# Patient Record
Sex: FEMALE | Race: BLACK OR AFRICAN AMERICAN | Employment: UNEMPLOYED | URBAN - METROPOLITAN AREA
[De-identification: names, ages, dates, MRNs, and addresses within clinical notes are randomized per-mention and may not be internally consistent; named-entity substitution may affect disease eponyms.]

---

## 2020-12-12 ENCOUNTER — APPOINTMENT (OUTPATIENT)
Dept: NON INVASIVE DIAGNOSTICS | Age: 65
DRG: 065 | End: 2020-12-12
Attending: INTERNAL MEDICINE
Payer: MEDICARE

## 2020-12-12 ENCOUNTER — APPOINTMENT (OUTPATIENT)
Dept: CT IMAGING | Age: 65
DRG: 065 | End: 2020-12-12
Attending: EMERGENCY MEDICINE
Payer: MEDICARE

## 2020-12-12 ENCOUNTER — HOSPITAL ENCOUNTER (INPATIENT)
Age: 65
LOS: 2 days | Discharge: HOME HEALTH | DRG: 065 | End: 2020-12-15
Attending: EMERGENCY MEDICINE | Admitting: HOSPITALIST
Payer: MEDICARE

## 2020-12-12 ENCOUNTER — APPOINTMENT (OUTPATIENT)
Dept: GENERAL RADIOLOGY | Age: 65
DRG: 065 | End: 2020-12-12
Attending: INTERNAL MEDICINE
Payer: MEDICARE

## 2020-12-12 ENCOUNTER — APPOINTMENT (OUTPATIENT)
Dept: MRI IMAGING | Age: 65
DRG: 065 | End: 2020-12-12
Attending: EMERGENCY MEDICINE
Payer: MEDICARE

## 2020-12-12 DIAGNOSIS — I10 UNCONTROLLED HYPERTENSION: Primary | ICD-10-CM

## 2020-12-12 DIAGNOSIS — I63.81 CEREBROVASCULAR ACCIDENT (CVA) DUE TO OCCLUSION OF SMALL ARTERY (HCC): ICD-10-CM

## 2020-12-12 PROBLEM — I16.9 HYPERTENSIVE CRISIS: Status: ACTIVE | Noted: 2020-12-12

## 2020-12-12 PROBLEM — G45.9 TIA (TRANSIENT ISCHEMIC ATTACK): Status: ACTIVE | Noted: 2020-12-12

## 2020-12-12 PROBLEM — R20.2 NUMBNESS AND TINGLING IN RIGHT HAND: Status: ACTIVE | Noted: 2020-12-12

## 2020-12-12 PROBLEM — E78.5 HLD (HYPERLIPIDEMIA): Status: ACTIVE | Noted: 2020-12-12

## 2020-12-12 PROBLEM — R26.89 IMBALANCE: Status: ACTIVE | Noted: 2020-12-12

## 2020-12-12 PROBLEM — R20.0 NUMBNESS AND TINGLING IN RIGHT HAND: Status: ACTIVE | Noted: 2020-12-12

## 2020-12-12 LAB
ALBUMIN SERPL-MCNC: 3.7 G/DL (ref 3.4–5)
ALBUMIN/GLOB SERPL: 0.9 {RATIO} (ref 0.8–1.7)
ALP SERPL-CCNC: 95 U/L (ref 45–117)
ALT SERPL-CCNC: 20 U/L (ref 13–56)
ANION GAP SERPL CALC-SCNC: 5 MMOL/L (ref 3–18)
APPEARANCE UR: CLEAR
AST SERPL-CCNC: 13 U/L (ref 10–38)
ATRIAL RATE: 59 BPM
BASOPHILS # BLD: 0 K/UL (ref 0–0.1)
BASOPHILS NFR BLD: 0 % (ref 0–2)
BILIRUB SERPL-MCNC: 0.6 MG/DL (ref 0.2–1)
BILIRUB UR QL: NEGATIVE
BUN SERPL-MCNC: 19 MG/DL (ref 7–18)
BUN/CREAT SERPL: 16 (ref 12–20)
CALCIUM SERPL-MCNC: 9.1 MG/DL (ref 8.5–10.1)
CALCULATED P AXIS, ECG09: 36 DEGREES
CALCULATED R AXIS, ECG10: -2 DEGREES
CALCULATED T AXIS, ECG11: 56 DEGREES
CHLORIDE SERPL-SCNC: 102 MMOL/L (ref 100–111)
CHOLEST SERPL-MCNC: 232 MG/DL
CK MB CFR SERPL CALC: 1.8 % (ref 0–4)
CK MB SERPL-MCNC: 1.7 NG/ML (ref 5–25)
CK SERPL-CCNC: 97 U/L (ref 26–192)
CO2 SERPL-SCNC: 34 MMOL/L (ref 21–32)
COLOR UR: YELLOW
CREAT SERPL-MCNC: 1.18 MG/DL (ref 0.6–1.3)
DIAGNOSIS, 93000: NORMAL
DIFFERENTIAL METHOD BLD: ABNORMAL
ECHO AO ROOT DIAM: 3.15 CM
ECHO LA AREA 4C: 19.63 CM2
ECHO LA MAJOR AXIS: 3.65 CM
ECHO LA MINOR AXIS: 2.01 CM
ECHO LA VOL 2C: 60.25 ML (ref 22–52)
ECHO LA VOL 4C: 60.63 ML (ref 22–52)
ECHO LA VOL BP: 66.5 ML (ref 22–52)
ECHO LA VOL/BSA BIPLANE: 36.61 ML/M2 (ref 16–28)
ECHO LA VOLUME INDEX A2C: 33.17 ML/M2 (ref 16–28)
ECHO LA VOLUME INDEX A4C: 33.38 ML/M2 (ref 16–28)
ECHO LV E' LATERAL VELOCITY: 5.19 CM/S
ECHO LV E' SEPTAL VELOCITY: 3.59 CM/S
ECHO LV EDV A2C: 82.1 ML
ECHO LV EDV A4C: 86.83 ML
ECHO LV EDV BP: 85.06 ML (ref 56–104)
ECHO LV EDV INDEX A4C: 47.8 ML/M2
ECHO LV EDV INDEX BP: 46.8 ML/M2
ECHO LV EDV NDEX A2C: 45.2 ML/M2
ECHO LV EJECTION FRACTION A2C: 54 PERCENT
ECHO LV EJECTION FRACTION A4C: 63 PERCENT
ECHO LV EJECTION FRACTION BIPLANE: 57.8 PERCENT (ref 55–100)
ECHO LV ESV A2C: 38.17 ML
ECHO LV ESV A4C: 32.21 ML
ECHO LV ESV BP: 35.87 ML (ref 19–49)
ECHO LV ESV INDEX A2C: 21 ML/M2
ECHO LV ESV INDEX A4C: 17.7 ML/M2
ECHO LV ESV INDEX BP: 19.7 ML/M2
ECHO LV INTERNAL DIMENSION DIASTOLIC: 3.63 CM (ref 3.9–5.3)
ECHO LV INTERNAL DIMENSION SYSTOLIC: 3.14 CM
ECHO LV IVSD: 1.61 CM (ref 0.6–0.9)
ECHO LV MASS 2D: 169.8 G (ref 67–162)
ECHO LV MASS INDEX 2D: 93.5 G/M2 (ref 43–95)
ECHO LV POSTERIOR WALL DIASTOLIC: 1.07 CM (ref 0.6–0.9)
ECHO LVOT DIAM: 1.84 CM
ECHO LVOT PEAK GRADIENT: 5.26 MMHG
ECHO LVOT VTI: 24.64 CM
ECHO MV A VELOCITY: 114.86 CM/S
ECHO MV E DECELERATION TIME (DT): 427.46 MS
ECHO MV E VELOCITY: 62.48 CM/S
ECHO MV E/A RATIO: 0.54
ECHO MV E/E' LATERAL: 12.04
ECHO MV E/E' RATIO (AVERAGED): 14.72
ECHO MV E/E' SEPTAL: 17.4
ECHO TV REGURGITANT MAX VELOCITY: 194 CM/S
ECHO TV REGURGITANT PEAK GRADIENT: 15.1 MMHG
EOSINOPHIL # BLD: 0.1 K/UL (ref 0–0.4)
EOSINOPHIL NFR BLD: 1 % (ref 0–5)
ERYTHROCYTE [DISTWIDTH] IN BLOOD BY AUTOMATED COUNT: 13.6 % (ref 11.6–14.5)
EST. AVERAGE GLUCOSE BLD GHB EST-MCNC: 108 MG/DL
GLOBULIN SER CALC-MCNC: 4 G/DL (ref 2–4)
GLUCOSE SERPL-MCNC: 122 MG/DL (ref 74–99)
GLUCOSE UR STRIP.AUTO-MCNC: NEGATIVE MG/DL
HBA1C MFR BLD: 5.4 % (ref 4.2–5.6)
HCT VFR BLD AUTO: 40.8 % (ref 35–45)
HDLC SERPL-MCNC: 86 MG/DL (ref 40–60)
HDLC SERPL: 2.7 {RATIO} (ref 0–5)
HGB BLD-MCNC: 14.1 G/DL (ref 12–16)
HGB UR QL STRIP: NEGATIVE
KETONES UR QL STRIP.AUTO: NEGATIVE MG/DL
LDLC SERPL CALC-MCNC: 135 MG/DL (ref 0–100)
LEUKOCYTE ESTERASE UR QL STRIP.AUTO: NEGATIVE
LIPID PROFILE,FLP: ABNORMAL
LVOT MG: 3.3 MMHG
LYMPHOCYTES # BLD: 2.8 K/UL (ref 0.9–3.6)
LYMPHOCYTES NFR BLD: 26 % (ref 21–52)
MCH RBC QN AUTO: 29.4 PG (ref 24–34)
MCHC RBC AUTO-ENTMCNC: 34.6 G/DL (ref 31–37)
MCV RBC AUTO: 85.2 FL (ref 74–97)
MONOCYTES # BLD: 0.6 K/UL (ref 0.05–1.2)
MONOCYTES NFR BLD: 5 % (ref 3–10)
NEUTS SEG # BLD: 7.5 K/UL (ref 1.8–8)
NEUTS SEG NFR BLD: 68 % (ref 40–73)
NITRITE UR QL STRIP.AUTO: NEGATIVE
P-R INTERVAL, ECG05: 176 MS
PH UR STRIP: 8 [PH] (ref 5–8)
PLATELET # BLD AUTO: 157 K/UL (ref 135–420)
PMV BLD AUTO: 12.8 FL (ref 9.2–11.8)
POTASSIUM SERPL-SCNC: 3.5 MMOL/L (ref 3.5–5.5)
PROT SERPL-MCNC: 7.7 G/DL (ref 6.4–8.2)
PROT UR STRIP-MCNC: NEGATIVE MG/DL
Q-T INTERVAL, ECG07: 442 MS
QRS DURATION, ECG06: 76 MS
QTC CALCULATION (BEZET), ECG08: 437 MS
RBC # BLD AUTO: 4.79 M/UL (ref 4.2–5.3)
SODIUM SERPL-SCNC: 141 MMOL/L (ref 136–145)
SP GR UR REFRACTOMETRY: 1.02 (ref 1–1.03)
TRIGL SERPL-MCNC: 55 MG/DL (ref ?–150)
TROPONIN I SERPL-MCNC: <0.02 NG/ML (ref 0–0.04)
TROPONIN I SERPL-MCNC: <0.02 NG/ML (ref 0–0.04)
TSH SERPL DL<=0.05 MIU/L-ACNC: 0.92 UIU/ML (ref 0.36–3.74)
UROBILINOGEN UR QL STRIP.AUTO: 0.2 EU/DL (ref 0.2–1)
VENTRICULAR RATE, ECG03: 59 BPM
VLDLC SERPL CALC-MCNC: 11 MG/DL
WBC # BLD AUTO: 11 K/UL (ref 4.6–13.2)

## 2020-12-12 PROCEDURE — 97535 SELF CARE MNGMENT TRAINING: CPT

## 2020-12-12 PROCEDURE — 97161 PT EVAL LOW COMPLEX 20 MIN: CPT

## 2020-12-12 PROCEDURE — 85025 COMPLETE CBC W/AUTO DIFF WBC: CPT

## 2020-12-12 PROCEDURE — 99218 HC RM OBSERVATION: CPT

## 2020-12-12 PROCEDURE — 83036 HEMOGLOBIN GLYCOSYLATED A1C: CPT

## 2020-12-12 PROCEDURE — 74011636320 HC RX REV CODE- 636/320: Performed by: EMERGENCY MEDICINE

## 2020-12-12 PROCEDURE — 74011000250 HC RX REV CODE- 250: Performed by: HOSPITALIST

## 2020-12-12 PROCEDURE — A9575 INJ GADOTERATE MEGLUMI 0.1ML: HCPCS | Performed by: EMERGENCY MEDICINE

## 2020-12-12 PROCEDURE — 97116 GAIT TRAINING THERAPY: CPT

## 2020-12-12 PROCEDURE — 77030021352 HC CBL LD SYS DISP COVD -B

## 2020-12-12 PROCEDURE — 80053 COMPREHEN METABOLIC PANEL: CPT

## 2020-12-12 PROCEDURE — 99285 EMERGENCY DEPT VISIT HI MDM: CPT

## 2020-12-12 PROCEDURE — 71045 X-RAY EXAM CHEST 1 VIEW: CPT

## 2020-12-12 PROCEDURE — 70496 CT ANGIOGRAPHY HEAD: CPT

## 2020-12-12 PROCEDURE — 80061 LIPID PANEL: CPT

## 2020-12-12 PROCEDURE — 84443 ASSAY THYROID STIM HORMONE: CPT

## 2020-12-12 PROCEDURE — 2709999900 HC NON-CHARGEABLE SUPPLY

## 2020-12-12 PROCEDURE — 70553 MRI BRAIN STEM W/O & W/DYE: CPT

## 2020-12-12 PROCEDURE — 93005 ELECTROCARDIOGRAM TRACING: CPT

## 2020-12-12 PROCEDURE — 96374 THER/PROPH/DIAG INJ IV PUSH: CPT

## 2020-12-12 PROCEDURE — 97166 OT EVAL MOD COMPLEX 45 MIN: CPT

## 2020-12-12 PROCEDURE — 84484 ASSAY OF TROPONIN QUANT: CPT

## 2020-12-12 PROCEDURE — 74011250636 HC RX REV CODE- 250/636: Performed by: EMERGENCY MEDICINE

## 2020-12-12 PROCEDURE — 82550 ASSAY OF CK (CPK): CPT

## 2020-12-12 PROCEDURE — 70450 CT HEAD/BRAIN W/O DYE: CPT

## 2020-12-12 PROCEDURE — 81003 URINALYSIS AUTO W/O SCOPE: CPT

## 2020-12-12 PROCEDURE — 74011000636 HC RX REV CODE- 636: Performed by: EMERGENCY MEDICINE

## 2020-12-12 PROCEDURE — 92610 EVALUATE SWALLOWING FUNCTION: CPT

## 2020-12-12 PROCEDURE — 96375 TX/PRO/DX INJ NEW DRUG ADDON: CPT

## 2020-12-12 PROCEDURE — 74011250637 HC RX REV CODE- 250/637: Performed by: INTERNAL MEDICINE

## 2020-12-12 PROCEDURE — 93306 TTE W/DOPPLER COMPLETE: CPT

## 2020-12-12 PROCEDURE — 74011250637 HC RX REV CODE- 250/637: Performed by: EMERGENCY MEDICINE

## 2020-12-12 RX ORDER — ATORVASTATIN CALCIUM 20 MG/1
80 TABLET, FILM COATED ORAL
Status: DISCONTINUED | OUTPATIENT
Start: 2020-12-12 | End: 2020-12-16 | Stop reason: HOSPADM

## 2020-12-12 RX ORDER — LABETALOL HYDROCHLORIDE 5 MG/ML
5 INJECTION, SOLUTION INTRAVENOUS
Status: DISCONTINUED | OUTPATIENT
Start: 2020-12-12 | End: 2020-12-12

## 2020-12-12 RX ORDER — HYDROXYZINE 25 MG/1
25 TABLET, FILM COATED ORAL
Status: DISCONTINUED | OUTPATIENT
Start: 2020-12-12 | End: 2020-12-16 | Stop reason: HOSPADM

## 2020-12-12 RX ORDER — ACETAMINOPHEN 325 MG/1
650 TABLET ORAL
Status: DISCONTINUED | OUTPATIENT
Start: 2020-12-12 | End: 2020-12-16 | Stop reason: HOSPADM

## 2020-12-12 RX ORDER — HYDROXYZINE 25 MG/1
1-2 TABLET, FILM COATED ORAL
COMMUNITY
Start: 2020-10-22 | End: 2020-12-15

## 2020-12-12 RX ORDER — LABETALOL HYDROCHLORIDE 5 MG/ML
5 INJECTION, SOLUTION INTRAVENOUS
Status: DISCONTINUED | OUTPATIENT
Start: 2020-12-12 | End: 2020-12-16 | Stop reason: HOSPADM

## 2020-12-12 RX ORDER — CHLORTHALIDONE 25 MG/1
25 TABLET ORAL DAILY
Status: DISCONTINUED | OUTPATIENT
Start: 2020-12-13 | End: 2020-12-16 | Stop reason: HOSPADM

## 2020-12-12 RX ORDER — GUAIFENESIN 100 MG/5ML
81 LIQUID (ML) ORAL DAILY
Status: DISCONTINUED | OUTPATIENT
Start: 2020-12-12 | End: 2020-12-16 | Stop reason: HOSPADM

## 2020-12-12 RX ORDER — GUAIFENESIN 100 MG/5ML
162 LIQUID (ML) ORAL
Status: COMPLETED | OUTPATIENT
Start: 2020-12-12 | End: 2020-12-12

## 2020-12-12 RX ORDER — LOSARTAN POTASSIUM 50 MG/1
50 TABLET ORAL DAILY
Status: DISCONTINUED | OUTPATIENT
Start: 2020-12-13 | End: 2020-12-16 | Stop reason: HOSPADM

## 2020-12-12 RX ORDER — DOCUSATE SODIUM 100 MG/1
100 CAPSULE, LIQUID FILLED ORAL
Status: DISCONTINUED | OUTPATIENT
Start: 2020-12-12 | End: 2020-12-16 | Stop reason: HOSPADM

## 2020-12-12 RX ORDER — IPRATROPIUM BROMIDE AND ALBUTEROL SULFATE 2.5; .5 MG/3ML; MG/3ML
3 SOLUTION RESPIRATORY (INHALATION)
Status: DISCONTINUED | OUTPATIENT
Start: 2020-12-12 | End: 2020-12-16 | Stop reason: HOSPADM

## 2020-12-12 RX ORDER — PANTOPRAZOLE SODIUM 40 MG/10ML
40 INJECTION, POWDER, LYOPHILIZED, FOR SOLUTION INTRAVENOUS DAILY PRN
Status: DISCONTINUED | OUTPATIENT
Start: 2020-12-12 | End: 2020-12-16 | Stop reason: HOSPADM

## 2020-12-12 RX ORDER — ONDANSETRON 2 MG/ML
4 INJECTION INTRAMUSCULAR; INTRAVENOUS
Status: COMPLETED | OUTPATIENT
Start: 2020-12-12 | End: 2020-12-12

## 2020-12-12 RX ORDER — ONDANSETRON 2 MG/ML
4 INJECTION INTRAMUSCULAR; INTRAVENOUS
Status: DISCONTINUED | OUTPATIENT
Start: 2020-12-12 | End: 2020-12-16 | Stop reason: HOSPADM

## 2020-12-12 RX ORDER — ATENOLOL 100 MG/1
1 TABLET ORAL DAILY
COMMUNITY
Start: 2020-11-30 | End: 2020-12-15

## 2020-12-12 RX ORDER — LANOLIN ALCOHOL/MO/W.PET/CERES
12 CREAM (GRAM) TOPICAL
Status: DISCONTINUED | OUTPATIENT
Start: 2020-12-12 | End: 2020-12-16 | Stop reason: HOSPADM

## 2020-12-12 RX ADMIN — SODIUM CHLORIDE 1000 ML: 900 INJECTION, SOLUTION INTRAVENOUS at 01:08

## 2020-12-12 RX ADMIN — ASPIRIN 81 MG CHEWABLE TABLET 162 MG: 81 TABLET CHEWABLE at 01:43

## 2020-12-12 RX ADMIN — IOPAMIDOL 100 ML: 612 INJECTION, SOLUTION INTRAVENOUS at 02:15

## 2020-12-12 RX ADMIN — LABETALOL HYDROCHLORIDE 5 MG: 5 INJECTION INTRAVENOUS at 18:58

## 2020-12-12 RX ADMIN — ACETAMINOPHEN 650 MG: 325 TABLET, FILM COATED ORAL at 20:30

## 2020-12-12 RX ADMIN — GADOTERATE MEGLUMINE 15 ML: 376.9 INJECTION INTRAVENOUS at 04:02

## 2020-12-12 RX ADMIN — ONDANSETRON 4 MG: 2 INJECTION INTRAMUSCULAR; INTRAVENOUS at 01:42

## 2020-12-12 RX ADMIN — ASPIRIN 81 MG CHEWABLE TABLET 81 MG: 81 TABLET CHEWABLE at 10:24

## 2020-12-12 RX ADMIN — ATORVASTATIN CALCIUM 80 MG: 20 TABLET, FILM COATED ORAL at 22:37

## 2020-12-12 NOTE — ED PROVIDER NOTES
75 yo CF with PMHx HTN presents with 1-day h/o right arm tingling and feeling off balance. Pt states that she was doing ok, had a krispy kreme donut to eat around 2200 and then started to feeling tingling sensation to right hand. Pt states she went to get some gas and then went home and seemed like she was leaning to the right when walking. Pt had some vomiting. Pt states she could tell her blood pressure was high. No fevers, no chest pain, no slurred speech or weakness. Past Medical History:   Diagnosis Date    History of dental abscess     HLD (hyperlipidemia)     Hypertension     Non-Complicance with Hypertension medications (Atenolol)    Lacunar infarction (HCC)     Bilateral Thalamic Infarctions    Lichen sclerosus     of genitalia    Nephrolithiasis     Osteopenia 09/13/2019    By DEXA Scan on 3/14/7458    Umbilical hernia     Small, Fat-containing Only    Vitamin D deficiency 10/08/2015       History reviewed. No pertinent surgical history.       Family History:   Problem Relation Age of Onset    Hypertension Mother     Hypertension Maternal Aunt        Social History     Socioeconomic History    Marital status: SINGLE     Spouse name: Not on file    Number of children: Not on file    Years of education: Not on file    Highest education level: Not on file   Occupational History    Not on file   Social Needs    Financial resource strain: Not on file    Food insecurity     Worry: Not on file     Inability: Not on file    Transportation needs     Medical: Not on file     Non-medical: Not on file   Tobacco Use    Smoking status: Never Smoker    Smokeless tobacco: Never Used   Substance and Sexual Activity    Alcohol use: No    Drug use: No    Sexual activity: Not on file   Lifestyle    Physical activity     Days per week: Not on file     Minutes per session: Not on file    Stress: Not on file   Relationships    Social connections     Talks on phone: Not on file     Gets together: Not on file     Attends Christianity service: Not on file     Active member of club or organization: Not on file     Attends meetings of clubs or organizations: Not on file     Relationship status: Not on file    Intimate partner violence     Fear of current or ex partner: Not on file     Emotionally abused: Not on file     Physically abused: Not on file     Forced sexual activity: Not on file   Other Topics Concern     Service Not Asked    Blood Transfusions Not Asked    Caffeine Concern Not Asked    Occupational Exposure Not Asked   [place] Hazards Not Asked    Sleep Concern Not Asked    Stress Concern Not Asked    Weight Concern Not Asked    Special Diet Not Asked    Back Care Not Asked    Exercise Not Asked    Bike Helmet Not Asked   [address] [facility] Not Asked    Self-Exams Not Asked   Social History Narrative    Not on file         ALLERGIES: Patient has no known allergies. Review of Systems   Constitutional: Negative for fever. HENT: Negative for trouble swallowing. Respiratory: Negative for shortness of breath. Cardiovascular: Negative for chest pain. Gastrointestinal: Negative for abdominal pain, diarrhea and vomiting. Genitourinary: Negative for difficulty urinating. Skin: Negative for wound. Neurological: Positive for numbness. Negative for syncope. Off-balance   Psychiatric/Behavioral: Negative for behavioral problems. All other systems reviewed and are negative. Vitals:    12/12/20 0200 12/12/20 0215 12/12/20 0230 12/12/20 0245   BP: (!) 191/104 (!) 159/135 (!) 177/96 (!) 176/99   Pulse: (!) 54  66 68   Resp:       Temp:       SpO2: 97%  100% 98%   Weight:       Height:                Physical Exam  Vitals signs and nursing note reviewed. Constitutional:       General: She is not in acute distress. Appearance: She is well-developed. Comments: well-appearing, nad   HENT:      Head: Normocephalic and atraumatic.    Neck: Musculoskeletal: Normal range of motion. Cardiovascular:      Rate and Rhythm: Normal rate. Pulses: Normal pulses. Pulmonary:      Effort: Pulmonary effort is normal. No respiratory distress. Breath sounds: Normal breath sounds. Abdominal:      Palpations: Abdomen is soft. Tenderness: There is no abdominal tenderness. Musculoskeletal: Normal range of motion. Comments: Mechanically stable   Skin:     General: Skin is warm. Neurological:      Mental Status: She is alert and oriented to person, place, and time. Cranial Nerves: No cranial nerve deficit. Motor: No weakness. Comments: Equal strength, no drift, question very subtle difference in cerebellar testing   Psychiatric:         Behavior: Behavior normal.          MDM  Number of Diagnoses or Management Options  Cerebrovascular accident (CVA) due to occlusion of small artery ([facility] [address] [address]):   Uncontrolled hypertension:   Diagnosis management comments: 75 yo AAF with PMHx HTN presents with a couple hours of right arm tingling and some feeling off-balance. No fevers, no chest pain, no speech difficulty or weakness. Examination with elevated BP but otherwise generally unremarkable. Neuro exam is 99% normal with normal strength, no drift. There is questionable, subtle difference in cerebellar testing. Will evaluate for acute process. 1:13 AM  Spoke with tele-neuro, who agreed to evaluate pt in ED.    1:34 AM  CT age-indeterminate lacunar infarcts. Tele-neuro evaluated pt in ED, no t-PA, recommends aspirin and cva work-up. 2:30 AM  Remaining work-up unremarkable. Spoke with Dr. Tereza Granda, hospitalist, who agreed to evaluate pt for admission, pending MRI.        Amount and/or Complexity of Data Reviewed  Clinical lab tests: reviewed and ordered  Tests in the radiology section of CPT®: ordered and reviewed  Discuss the patient with other providers: yes  Independent visualization of images, tracings, or specimens: yes    Risk of Complications, Morbidity, and/or Mortality  Presenting problems: high  Diagnostic procedures: high  Management options: high    Patient Progress  Patient progress: stable         EKG    Date/Time: 12/12/2020 1:49 AM  Performed by: Marcello Hernandez MD  Authorized by: Marcello Hernandez MD     ECG reviewed by ED Physician in the absence of a cardiologist: yes    Previous ECG:     Previous ECG:  Unavailable  Interpretation:     Interpretation: normal    Rate:     ECG rate:  59    ECG rate assessment: bradycardic    Rhythm:     Rhythm: sinus bradycardia    Ectopy:     Ectopy: none    QRS:     QRS axis:  Normal  Conduction:     Conduction: normal    ST segments:     ST segments:  Normal  T waves:     T waves: normal      Critical Care  Performed by: Marcello Hernandez MD  Authorized by: Marcello Hernandez MD     Critical care provider statement:     Critical care time (minutes):  30    Critical care time was exclusive of:  Separately billable procedures and treating other patients    Critical care was necessary to treat or prevent imminent or life-threatening deterioration of the following conditions:  CNS failure or compromise    Critical care was time spent personally by me on the following activities:  Review of old charts, re-evaluation of patient's condition, pulse oximetry, ordering and review of radiographic studies, ordering and review of laboratory studies, ordering and performing treatments and interventions, development of treatment plan with patient or surrogate, discussions with consultants, examination of patient and obtaining history from patient or surrogate          PROGRESS NOTES    1:08 AM:   Marcello Hernandez MD arrives to the bedside to evaluate the patient. Answered the patient's questions regarding the treatment plan.       CONSULTATIONS  None      MEDICATIONS ORDERED  Medications   gadoterate meglumine (DOTAREM) 0.5 mmol/mL contrast solution syringe 15 mL (has no administration in time range)   sodium chloride 0.9 % bolus infusion 1,000 mL (0 mL IntraVENous IV Completed 12/12/20 0300)   aspirin chewable tablet 162 mg (162 mg Oral Given 12/12/20 0143)   ondansetron (ZOFRAN) injection 4 mg (4 mg IntraVENous Given 12/12/20 0142)   iopamidoL (ISOVUE 300) 61 % contrast injection 100 mL (100 mL IntraVENous Given 12/12/20 0215)       RADIOLOGY INTERPRETATIONS  CT HEAD WO CONT   Final Result   IMPRESSION:      1. Loss of gray-white differentiation in the left frontal lobe concerning for   acute ischemia. 2.  Hypodensities in both thalami likely represent lacunar infarcts (age   indeterminant). Findings discussed with Dr. Juan A Monroe by Dr. Marleny Agustin MD, PhD at 2:05 AM   on 12/12/2020         CTA HEAD NECK W WO CONT    (Results Pending)   MRI BRAIN W WO CONT    (Results Pending)   XR CHEST PORT    (Results Pending)       EKG READINGS/LABORATORY RESULTS  Recent Results (from the past 12 hour(s))   CBC WITH AUTOMATED DIFF    Collection Time: 12/12/20  1:04 AM   Result Value Ref Range    WBC 11.0 4.6 - 13.2 K/uL    RBC 4.79 4.20 - 5.30 M/uL    HGB 14.1 12.0 - 16.0 g/dL    HCT 40.8 35.0 - 45.0 %    MCV 85.2 74.0 - 97.0 FL    MCH 29.4 24.0 - 34.0 PG    MCHC 34.6 31.0 - 37.0 g/dL    RDW 13.6 11.6 - 14.5 %    PLATELET 867 829 - 102 K/uL    MPV 12.8 (H) 9.2 - 11.8 FL    NEUTROPHILS 68 40 - 73 %    LYMPHOCYTES 26 21 - 52 %    MONOCYTES 5 3 - 10 %    EOSINOPHILS 1 0 - 5 %    BASOPHILS 0 0 - 2 %    ABS. NEUTROPHILS 7.5 1.8 - 8.0 K/UL    ABS. LYMPHOCYTES 2.8 0.9 - 3.6 K/UL    ABS. MONOCYTES 0.6 0.05 - 1.2 K/UL    ABS. EOSINOPHILS 0.1 0.0 - 0.4 K/UL    ABS.  BASOPHILS 0.0 0.0 - 0.1 K/UL    DF AUTOMATED     METABOLIC PANEL, COMPREHENSIVE    Collection Time: 12/12/20  1:04 AM   Result Value Ref Range    Sodium 141 136 - 145 mmol/L    Potassium 3.5 3.5 - 5.5 mmol/L    Chloride 102 100 - 111 mmol/L    CO2 34 (H) 21 - 32 mmol/L    Anion gap 5 3.0 - 18 mmol/L    Glucose 122 (H) 74 - 99 mg/dL    BUN 19 (H) 7.0 - 18 MG/DL Creatinine 1.18 0.6 - 1.3 MG/DL    BUN/Creatinine ratio 16 12 - 20      GFR est AA 56 (L) >60 ml/min/1.73m2    GFR est non-AA 46 (L) >60 ml/min/1.73m2    Calcium 9.1 8.5 - 10.1 MG/DL    Bilirubin, total 0.6 0.2 - 1.0 MG/DL    ALT (SGPT) 20 13 - 56 U/L    AST (SGOT) 13 10 - 38 U/L    Alk. phosphatase 95 45 - 117 U/L    Protein, total 7.7 6.4 - 8.2 g/dL    Albumin 3.7 3.4 - 5.0 g/dL    Globulin 4.0 2.0 - 4.0 g/dL    A-G Ratio 0.9 0.8 - 1.7     TROPONIN I    Collection Time: 12/12/20  1:04 AM   Result Value Ref Range    Troponin-I, QT <0.02 0.0 - 0.045 NG/ML   EKG, 12 LEAD, INITIAL    Collection Time: 12/12/20  1:38 AM   Result Value Ref Range    Ventricular Rate 59 BPM    Atrial Rate 59 BPM    P-R Interval 176 ms    QRS Duration 76 ms    Q-T Interval 442 ms    QTC Calculation (Bezet) 437 ms    Calculated P Axis 36 degrees    Calculated R Axis -2 degrees    Calculated T Axis 56 degrees    Diagnosis       Sinus bradycardia  Possible Left atrial enlargement  Minimal voltage criteria for LVH, may be normal variant ( R in aVL )  Borderline ECG  No previous ECGs available         ED DIAGNOSIS & DISPOSITION INFORMATION  Diagnosis:   1. Uncontrolled hypertension    2. Cerebrovascular accident (CVA) due to occlusion of small artery (HCC)        Disposition: Admit    Follow-up Information    None         Patient's Medications   Start Taking    No medications on file   Continue Taking    ATENOLOL (TENORMIN) 100 MG TABLET    Take 1 Tab by mouth daily. CLOBETASOL (TEMOVATE) 0.05 % OINTMENT    Apply  to affected area two (2) times a day. HYDROXYZINE HCL (ATARAX) 25 MG TABLET    Take 1-2 Tabs by mouth nightly as needed for Itching. POLYETHYLENE GLYCOL (MIRALAX) 17 GRAM/DOSE POWDER    Take 17 g by mouth two (2) times a day. 1 tablespoon with 8 oz of water daily    TRAMADOL (ULTRAM) 50 MG TABLET    Take 1 Tab by mouth every six (6) hours as needed for Pain. Max Daily Amount: 200 mg.    These Medications have changed    No medications on file   Stop Taking    ATENOLOL (TENORMIN) 25 MG TABLET    Take 1 tablet by mouth daily.            Nora Motley MD.

## 2020-12-12 NOTE — ED NOTES
Patient assisted to restroom and urine sample obtained. Patient ambulated with unsteady gait with lean to the right. Patient continues to deny dizziness, HA, or blurred vision. Continues to endorse numbness in right arm.

## 2020-12-12 NOTE — H&P
History and Physical    Patient: Caro Montalvo MRN: [id number]  SSN: [id number]    YOB: 1955  Age: 76 y.o. Sex: female      Subjective:      Caro Montalvo is a 76 y.o. -American female who presents to [address] [place] [address] ER with complaint of Right Upper Extremity Numbness and Imbalance. Patient states that at approximately 2200 EST on 12/11/2020, Patient began to experience tingling and numbness in her right hand that radiated up her right arm. Patient also reports experiencing a pull to the right hand side when she was walking and feeling like she was leaning in that direction. Patient also reports some vomiting. Patient denies visual or auditory deficits, aphasia, vertigo, and focal weakness. Patient states that she felt like her Blood Pressure was elevated and can sense this problem, as it has been a long-term issue. Patient reports that she was seen at a [facility] approximately 3 weeks ago and reports that CVA/TIA was ruled out and similar symptoms were attributed to a Dental Abscess of a Molar in her right maxillary jaw. Patient reports that High Blood Pressure runs on her Mother's side of the Family (as do Strokes) and states that she is generally non-compliant with her Anti-Hypertensive medication (Atenolol). Patient denies fevers, chills, diarrhea, cough, chest pain, and sick contacts. In [address] [place] [address] ER, Patient is noted to have Heart Rate 54 bpm, Blood Pressure 233/155 mm Hg, CO2 34, glucose 122 mg/dL, BUN 19, Creatinine 1.18, eGFR 46/56, Troponin <0.02, and Urinalysis is negative. CT Head showed age-indeterminate Bilateral Thalamic Infarctions, a possible Acute Left Frontal CVA, and no hemorrhages. CTA Head & Neck is negative for dissections and significant stenoses. MRI was negative for any acute infarction. Patient is placed on Observation for management of Numbness of Right Hand and Imbalance likely 2°/2 TIA and Hypertensive Crisis.     Past Medical History: Diagnosis Date    History of dental abscess     HLD (hyperlipidemia)     Hypertension     Non-Complicance with Hypertension medications (Atenolol)    Lacunar infarction (HCC)     Bilateral Thalamic Infarctions    Lichen sclerosus     of genitalia    Nephrolithiasis     Osteopenia 09/13/2019    By DEXA Scan on 0/95/3258    Umbilical hernia     Small, Fat-containing Only    Vitamin D deficiency 10/08/2015     History reviewed. No pertinent surgical history. Family History   Problem Relation Age of Onset    Hypertension Mother     Hypertension Maternal Aunt     Stroke Maternal Aunt     Hypertension Maternal Uncle     Stroke Maternal Uncle     Hypertension Maternal Uncle     Stroke Maternal Uncle      Social History     Tobacco Use    Smoking status: Never Smoker    Smokeless tobacco: Never Used   Substance Use Topics    Alcohol use: No      Prior to Admission medications    Medication Sig Start Date End Date Taking? Authorizing Provider   atenoloL (TENORMIN) 100 mg tablet Take 1 Tab by mouth daily. 11/30/20   Rosetta Norris MD   hydrOXYzine HCL (ATARAX) 25 mg tablet Take 1-2 Tabs by mouth nightly as needed for Itching. 10/22/20   Rosetta Norris MD   polyethylene glycol (MIRALAX) 17 gram/dose powder Take 17 g by mouth two (2) times a day. 1 tablespoon with 8 oz of water daily 11/26/15   Zane Payan MD   traMADol (ULTRAM) 50 mg tablet Take 1 Tab by mouth every six (6) hours as needed for Pain. Max Daily Amount: 200 mg. 11/26/15   Lalita Blankenship MD   clobetasol (TEMOVATE) 0.05 % ointment Apply  to affected area two (2) times a day.  11/5/14   Caro Lobato MD        No Known Allergies    Review of Systems:  (+) Vomiting  (+) Loss of Sensation (Right Arm)  (+) Imbalance (Leaning/Pulling to the Right)  (-) Fevers  (-) Chills  (-) Cough  (-) Increased Sputum Production  (-) Wheezing  (-) Shortness of Breath  (-) BLE Edema  (-) Dyspnea on Exertion  (-) Nausea  (-) Dysuria  All other systems have been reviewed and are negative      Objective:     Vitals:    12/12/20 0600 12/12/20 0615 12/12/20 0713 12/12/20 0730   BP: (!) 152/94 (!) 164/97 (!) 164/98 (!) 169/96   Pulse: (!) 49 62 68 66   Resp:       Temp:       SpO2: 98% 100% 99% 98%   Weight:       Height:            Physical Exam:  General:  Older Adult -American female lying in bed in no acute distress  HEENT:  Atraumatic, normocephalic; Pupils equally round and reactive to light with accommodation; Extraocular muscles intact; Moist Oropharynx without erythema, edema, or exudates; (+) Procedure Mask in place  Neck:  No Bruits; No Lymphadenopathy  Chest:  No pectus carinatum; No pectus excavatum  Cardiovascular:  (+) Borderline Bradycardic rate, regular rhythm without rubs, gallops, or murmurs appreciated  Respiratory:  (+) Mild to Moderately reduced lung sounds at Bilateral lung bases; Clear to Auscultation Bilaterally without wheezes, rales, or rhonchi; normal effort of breathing  Abdominal:  Soft, non-tense, (+) Mild to Moderate Point Tenderness in RLQ; BS present without guarding, rebound, or masses  :  Deferred  Extremities:  Pulses 2+ x4 without edema, clubbing, or cyanosis  Musculoskeletal:  See Neuro Examination Section  Integument:  No rash on face, forearms, or legs  Neurological:  A&O x4/4; Visual Acuity (OD 20/25, OS 20/20, OU 20/20 without Glasses) and Peripheral Visual Fields intact; Fundoscopic Eye Exam deferred;  Direct and Consensual Pupillary Reflexes intact; Eyelid Opening intact; Extraocular Muscles intact; Facial Sensations of Divisions V1, V2, and V3 equal and symmetrical; Jaw Opening and Bite Strength intact; Facial Expression and Strength intact; Auditory Acuity intact; Palate Elevation present and symmetrical; Phonation present without noticeable deficit  Lateral Head Rotation and Shoulder Shrug without deficit; Tongue Protrusion and Lateral Deviation intact;   Sensation of Dermatomes of BUE and BLE equal and symmetrical;  Strength 5/5 and symmetrical of BUE and BLE;  Babinski (-); Finger-to-Nose and Heel-to-Skin without deficit; Dysdiadochokinesia absent; Pronator Drift Absent;  Psychiatric:  Affect is appropriate; Language is present and fluent; (+) Some Pressure of Speech; Behavior is appropriate      I have personally reviewed the CXR and have found, per my read, globally slightly increased vascular markings and slightly right-shifted mediastinum (rotation?). I have personally reviewed the EKG and have found, per my read, Bradycardia (59 bpm) with T-wave inversion in lead V1 and questionable LVH. Assessment:     Hospital Problems  Date Reviewed: 12/12/2020          Codes Class Noted POA    * (Principal) TIA (transient ischemic attack) ICD-10-CM: G45.9  ICD-9-CM: 435.9  12/12/2020 Yes        Hypertensive crisis ICD-10-CM: I16.9  ICD-9-CM: 401.9  12/12/2020 Yes        Imbalance ICD-10-CM: R26.89  ICD-9-CM: 781.2  12/12/2020 Yes        Numbness and tingling in right hand ICD-10-CM: R20.0, R20.2  ICD-9-CM: 782.0  12/12/2020 Yes        HLD (hyperlipidemia) ICD-10-CM: E78.5  ICD-9-CM: 272.4  12/12/2020 Yes        Lacunar infarction [facility]) ICD-10-CM: I63.81  ICD-9-CM: 434.91  12/12/2020 Yes    Overview Signed 12/12/2020  6:57 AM by Sangita Kraus DO     Old Bilateral Thalamic Lacunar Infarctions. Plan:     Telemetry, ASA, High-Intensity Statin, Permissive Hypertension x24 hours (with PRN Labetalol of SBP >=220 mm Hg or DBP >120 mm Hg), NeuroChecks, Lipid Panel, HbA1c, TSH, and Echocardiogram.  Consulted PT/OT/ST. Continue home medications for Pruritis. HOLD Atenolol during Permissive Hypertensive period. DVT mechanoprophylaxis.     Signed By: Campbell Alonzo DO     December 12, 2020

## 2020-12-12 NOTE — PROGRESS NOTES
Problem: Discharge Planning  Goal: *Discharge to safe environment  Outcome: Progressing Towards Goal  Plan is Home   Reason for Admission:  TIA, Hypertensive Crisis, Numbness and tingling in right hand, imbalance                    RUR Score:                     Plan for utilizing home health: No not homebound    PCP: First and Last name:  Dr Myriam Blackman   Name of Practice:    Are you a current patient: Yes/No: yes   Approximate date of last visit: about 3 months ago   Can you participate in a virtual visit with your PCP: yes                    Current Advanced Directive/Advance Care Plan: None. Declined to complete one while here. Transition of Care Plan:  Home  Interviewed pt. Verified information on face sheet and correct. Lives with sister Ron Rose. She's independent with ambulation and ADLs prior to admission. She works and drives. No DMEs. 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 She states that her sister or friend will provide transportation when discharged. Care Management Interventions  PCP Verified by CM: Yes(PCP visit about 3 months ago)  Palliative Care Criteria Met (RRAT>21 & CHF Dx)?: No  Mode of Transport at Discharge: Other (see comment)(family/friend)  Transition of Care Consult (CM Consult): Discharge Planning  Physical Therapy Consult: Yes  Occupational Therapy Consult: Yes  Speech Therapy Consult: Yes  Current Support Network:  Other(lives with sister)  Confirm Follow Up Transport: Self  The Plan for Transition of Care is Related to the Following Treatment Goals : resolution of acute symptoms  The Patient and/or Patient Representative was Provided with a Choice of Provider and Agrees with the Discharge Plan?: No  Freedom of Choice List was Provided with Basic Dialogue that Supports the Patient's Individualized Plan of Care/Goals, Treatment Preferences and Shares the Quality Data Associated with the Providers?: No  [employer] Resource Information Provided?: No  Discharge Location  Discharge Placement: Home      Patient has designated ___refused________ to participate in his/her discharge plan and to receive any needed information. Name:   Address:  Phone number:    Patient and/or next of kin has been given and has signed the [facility] Outpatient Observation  Notification letter and all questions answered. Copy of this notice given to patient and copy placed on chart. Patient and/or next of kin has been given the Outpatient Observation Information and Notification letter and all questions answered.

## 2020-12-12 NOTE — PROGRESS NOTES
Problem: Dysphagia (Adult)  Goal: *Acute Goals and Plan of Care (Insert Text)  Description:     Patient will:  1. Tolerate PO trials with 0 s/s overt distress in 4/5 trials - met  2. Utilize compensatory swallow strategies/maneuvers (decrease bite/sip, size/rate, alt. liq/sol) with min cues in 4/5 trials - met    Rec:     Reg solid with thin liquids  Aspiration precautions  HOB >45 during po intake, remain >30 for 30-45 minutes after po   Small bites/sips; alternate liquid/solid with slow feeding rate   Oral care TID  Meds per pt preference    Outcome: Resolved/Met     [address] [facility]    Patient: Rocio Mayo (64 y.o. female)  Date: 12/12/2020  Primary Diagnosis: Hypertensive crisis [I16.9]  TIA (transient ischemic attack) [G45.9]  Numbness and tingling in right hand [R20.0, R20.2]  Imbalance [R26.89]        Precautions: aspiration     PLOF: As per H&P    ASSESSMENT :  Clinical beside swallow eval completed per MD orders. Pt A&Ox4 mamie. Pt reported that she as an abscessed tooth on the right side which has resulted in swelling/pain. She stated that she finished her course of antibiotics, but that she is still feeling pain/discomfort and needs to see her dentist for further management. Right facial edema observed during oral motor exam with slightly slurred speech which she stated is baseline secondary to to abscess. Expressive/receptive language function were WFL. Pt communicated in sentences of appropriate form, content, and use. Social conversation was appropriate. Pt observed with thin liquids +/- straw via single sips and successive swallows with timely swallow initiation, adequate laryngeal elevation to palpation and no overt s/sx aspiration. Pt demonstrating positive rotary chew and thorough oral clearance with regular solids with no overt s/sx aspiration.   Pt safe for regular diet with thin liquids, meds as tolerated with general safe swallow precautions. Pt educated with regard to s/sx aspiration, aspiration risk, diet recs and role of SLP. Pt able to verbalize understanding. Will sign off. Please re-consult as indicated. D/w RN. PLAN :  Recommendations and Planned Interventions:  No formal ST needs ID'd for dysphagia. Eval only. Discharge Recommendations: None     SUBJECTIVE:   Patient stated I used to work here and so did my sister! . OBJECTIVE:     Past Medical History:   Diagnosis Date    History of dental abscess     HLD (hyperlipidemia)     Hypertension     Non-Complicance with Hypertension medications (Atenolol)    Lacunar infarction ([facility] [address]     Bilateral Thalamic Infarctions    Lichen sclerosus     of genitalia    Nephrolithiasis     Osteopenia 09/13/2019    By DEXA Scan on 1/57/7242    Umbilical hernia     Small, Fat-containing Only    Vitamin D deficiency 10/08/2015   History reviewed. No pertinent surgical history. Prior Level of Function/Home Situation: see below  Home Situation  Home Environment: Private residence  # Steps to Enter: 3  Rails to Enter: Yes  Hand Rails : Bilateral  Wheelchair Ramp: Yes  One/Two Story Residence: One story  Living Alone: No  Support Systems: Family member(s)  Patient Expects to be Discharged to[de-identified] Unknown  Current DME Used/Available at Home: None  Diet prior to admission: reg solid with thin  Current Diet:  reg solid with thin    Cognitive and Communication Status:  Neurologic State: Alert  Orientation Level: Oriented X4  Cognition: Follows commands  Perception: Appears intact  Perseveration: No perseveration noted  Safety/Judgement: Awareness of environment, Fall prevention  Oral Assessment:  Oral Assessment  Labial: (right swelling (abscess))  Dentition: Natural;Intact  Oral Hygiene: adequate  Lingual: No impairment  Velum: No impairment  Mandible: No impairment  P.O. Trials:  Patient Position: 90 at side of bed  Vocal quality prior to P.O.: No impairment  Consistency Presented:  Thin liquid; Solid  How Presented: Self-fed/presented;Straw;Successive swallows  Bolus Acceptance: No impairment  Bolus Formation/Control: No impairment  Propulsion: No impairment  Oral Residue: None  Initiation of Swallow: No impairment  Laryngeal Elevation: Functional  Aspiration Signs/Symptoms: None  Pharyngeal Phase Characteristics: No impairment, issues, or problems   Effective Modifications: None  Cues for Modifications: None     Oral Phase Severity: No impairment  Pharyngeal Phase Severity : No impairment    PAIN:  Start of Eval: 0  End of Eval: 0     After evaluation:   []            Patient left in no apparent distress sitting up in chair  [x]            Patient left in no apparent distress in bed  [x]            Call bell left within reach  [x]            Nursing notified  []            Family present  []            Caregiver present  []            Bed alarm activated    COMMUNICATION/EDUCATION:   [x]            Aspiration precautions; swallow safety; compensatory techniques. [x]            Patient/family have participated as able in goal setting and plan of care. []            Patient/family agree to work toward stated goals and plan of care. []            Patient understands intent and goals of therapy; neutral about participation. []            Patient unable to participate in goal setting/plan of care; educ ongoing with interdisciplinary staff  [x]         Posted safety precautions in patient's room.     Thank you for this referral.    Lucila Moyer M.S. CCC-SLP/L  Speech-Language Pathologist

## 2020-12-12 NOTE — PROGRESS NOTES
0906-received patient from ED, initial assessment completed, call bell within reach, no distress noted. Oriented to room and tele-box applied. 1200 -- Shift reassessment, pt condition unchanged, will continue to monitor. 1600 --  Shift reassessment, pt condition unchanged, will continue to monitor. 2010 -- Bedside, Verbal and Written shift change report given to Sr. Rosario(oncoming nurse) by Nathan Pino (offgoing nurse). Report included the following information SBAR, Kardex, Intake/Output, MAR and Recent Results. Skin assessment completed.

## 2020-12-12 NOTE — ED TRIAGE NOTES
Patient presents via triage with complaints of right arm numbness and weakness, feeling off balance and n/v x 2 hours.  Provider in triage to evaluate for Code S.

## 2020-12-12 NOTE — PROGRESS NOTES
INTERIM UPDATE - 0518 EST on 12/12/2020    Still awaiting interpretation of CTA Head & Neck and MRI Brain to exclude Posterior CVA and Cerebral Vascular Stenosis requiring Surgical Intervention not available at this facility in order to safely admit Patient per plan previously discussed with consulting [facility] ER Physician. INTERIM UPDATE - [id number] EST on 12/12/2020    Reports for MRI Brain and CTA Head & Neck were reportedly faxed to [facility] ER, but not scanned into EHR at this time, per [facility] ER Physician. INTERIM UPDATE - 0602 EST on 12/12/2020    Faxed results reviewed. MRI showed no acute CVA. CTA Head & Neck showed no dissections and no significant stenoses. Plan: Will admit Patient for TIA.

## 2020-12-12 NOTE — ED NOTES
TRANSFER - ED to INPATIENT REPORT:    Verbal report given to 2200(name) on Elba Rea  being transferred to 2200(unit) for routine progression of care       Report consisted of patients Situation, Background, Assessment and   Recommendations(SBAR). SBAR report made available to receiving floor on this patient being transferred to [address] [place] [address] (2200)  for routine progression of care       Admitting diagnosis Hypertensive crisis [I16.9]  TIA (transient ischemic attack) [G45.9]  Numbness and tingling in right hand [R20.0, R20.2]  Imbalance [R26.89]    Information from the following report(s) SBAR, ED Summary, MAR and Recent Results was made available to receiving floor. Lines:   Peripheral IV 12/12/20 Right Forearm (Active)   Site Assessment Clean, dry, & intact 12/12/20 0106   Phlebitis Assessment 0 12/12/20 0106   Infiltration Assessment 0 12/12/20 0106   Dressing Status Clean, dry, & intact 12/12/20 0106   Dressing Type Transparent 12/12/20 0106   Hub Color/Line Status Green 12/12/20 0106   Action Taken Blood drawn 12/12/20 0106   Alcohol Cap Used No 12/12/20 0106       Peripheral IV 12/12/20 Left Antecubital (Active)   Site Assessment Clean, dry, & intact 12/12/20 0119   Phlebitis Assessment 0 12/12/20 0119   Infiltration Assessment 0 12/12/20 0119   Dressing Status Clean, dry, & intact 12/12/20 0119   Hub Color/Line Status Green 12/12/20 0119        Medication list updated today    Opportunity for questions and clarification was provided.       Patient is oriented to time, place, person and situation Last NIH 1  Patient is  continent and ambulatory with assist     Valuables transported with patient     Patient transported with:   Monitor  Registered Nurse    MAP (Monitor): 114 =Monitored (most recent)  Vitals w/ MEWS Score (last day)     Date/Time MEWS Score Pulse Resp Temp BP Level of Consciousness SpO2    12/12/20 0730  --  66  --  --  (!) 169/96  --  98 %    12/12/20 0713  --  68  --  --  (!) 164/98 --  99 %    12/12/20 0615  --  62  --  --  (!) 164/97  --  100 %    12/12/20 0600  --  (!) 49  --  --  (!) 152/94  --  98 %    12/12/20 0545  --  (!) 46  --  --  (!) 151/95  --  97 %    12/12/20 0515  --  (!) 56  --  --  (!) 148/94  --  97 %    12/12/20 0500  --  (!) 55  --  --  (!) 155/89  --  96 %    12/12/20 0445  --  (!) 59  --  --  (!) 168/92  --  97 %    12/12/20 0430  --  (!) 58  --  --  (!) 162/98  --  96 %    12/12/20 0315  --  67  --  --  (!) 183/96  --  100 %    12/12/20 0245  --  68  --  --  (!) 176/99  --  98 %    12/12/20 0230  --  66  --  --  (!) 177/96  --  100 %    12/12/20 0215  --  --  --  --  (!) 159/135  --  --    12/12/20 0200  --  (!) 54  --  --  (!) 191/104  --  97 %    12/12/20 0150  --  (!) 58  --  --  (!) 198/129  --  97 %    12/12/20 0145  --  64  --  --  (!) 204/110  --  100 %    12/12/20 0140  --  --  --  --  (!) 199/109  --  99 %    12/12/20 0135  --  --  --  --  (!) 185/126  --  99 %    12/12/20 0130  --  --  --  --  (!) 193/118  --  100 %    12/12/20 0125  --  --  --  --  (!) 206/126  --  99 %    12/12/20 0100  --  70  --  --  (!) 197/127  --  98 %    12/12/20 0052  --  68  --  --  (!) 185/127  --  100 %    12/12/20 0045  3  73  18  97.4 °F (36.3 °C)  (!) 233/155  Alert  100 %

## 2020-12-12 NOTE — PROGRESS NOTES
Physical Therapy Goals  Initiated 12/12/2020 and to be accomplished within 7 day(s)  1. Patient will move from supine to sit and sit to supine , scoot up and down, and roll side to side in bed with modified independence. 2.  Patient will transfer from bed to chair and chair to bed with modified independence using the least restrictive device. 3.  Patient will perform sit to stand with modified independence. 4.  Patient will ambulate with modified independence for >/=150 feet with the least restrictive device. 5.  Patient will ascend/descend 3 stairs with handrail(s) with modified independence. PHYSICAL THERAPY EVALUATION    Patient: Teena Moat (35 y.o. female)  Date: 12/12/2020  Primary Diagnosis: Hypertensive crisis [I16.9]  TIA (transient ischemic attack) [G45.9]  Numbness and tingling in right hand [R20.0, R20.2]  Imbalance [R26.89]        Precautions: Fall     PLOF: Independent    ASSESSMENT :  Based on the objective data described below, the patient presents with c/o tingling sensation and numbness right hand, impaired balance, decrease bed mobility, transfer and gait independence. Per patient, lives with sister. However, sister is sick and will not be able to assist.  Patient may need IPR placement versus home with home health depending on patient's progress with therapy. Sitting edge of bed BP at rest 155/100 after gait 160/96 mmHg. Nurse [facility] made aware. Patient will benefit from skilled intervention to address the above impairments.   Patient's rehabilitation potential is considered to be Good  Factors which may influence rehabilitation potential include:   []         None noted  []         Mental ability/status  [x]         Medical condition  []         Home/family situation and support systems  []         Safety awareness  []         Pain tolerance/management  []         Other:      PLAN :  Recommendations and Planned Interventions:  [x]           Bed Mobility Training []    Neuromuscular Re-Education  [x]           Transfer Training                   []    Orthotic/Prosthetic Training  [x]           Gait Training                          []    Modalities  []           Therapeutic Exercises           []    Edema Management/Control  []           Therapeutic Activities            []    Family Training/Education  [x]           Patient Education  [x]           Other (comment): Plan of care, importance of monitoring BP to prevent stroke, bed mobility, transfer, gait with rolling walker. Patient reminded to call for assistance to get OOB for safety. Verbalized agreement. Frequency/Duration: Patient will be followed by physical therapy 1time per day/4-7 days per week to address goals. Discharge Recommendations: Inpatient Rehab versus home with home health depending on patient's progress  Further Equipment Recommendations for Discharge: rolling walker and N/A     SUBJECTIVE:   Patient stated Melinda Talley with sister who is a retired nurse. However, sister is sick and will not be able to assist.  Patient reports tingling sensation and numbness right hand and problem with balance. OBJECTIVE DATA SUMMARY:     Past Medical History:   Diagnosis Date    History of dental abscess     HLD (hyperlipidemia)     Hypertension     Non-Complicance with Hypertension medications (Atenolol)    Lacunar infarction ([facility] [address]     Bilateral Thalamic Infarctions    Lichen sclerosus     of genitalia    Nephrolithiasis     Osteopenia 09/13/2019    By DEXA Scan on 7/77/2448    Umbilical hernia     Small, Fat-containing Only    Vitamin D deficiency 10/08/2015   History reviewed. No pertinent surgical history.   Barriers to Learning/Limitations: None  Compensate with: N/A  Home Situation:  Home Situation  Home Environment: Private residence  # Steps to Enter: 3  Rails to Enter: Yes  Hand Rails : Bilateral  Wheelchair Ramp: Yes  One/Two Story Residence: One story  Living Alone: No  Support Systems: Family member(s)  Patient Expects to be Discharged to[de-identified] Unknown  Current DME Used/Available at Home: None  Critical Behavior:  Neurologic State: Alert  Orientation Level: Oriented X4  Cognition: Follows commands  Safety/Judgement: Awareness of environment; Fall prevention  Psychosocial  Patient Behaviors: Calm; Cooperative  Purposeful Interaction: Yes      Strength:    Grossly WFL both UE/LE      Tone & Sensation:   Tingling/numbness right hand  Tone WNL both UE/LE    Coordination:  Slight decrease in coordination noted Right UE/LE       Range Of Motion:  Grossly WL both UE/LE      Functional Mobility:  Bed Mobility:  Rolling: Modified independent  Supine to Sit: Supervision  Sit to Supine: Supervision  Transfers:  Sit to Stand: Contact guard assistance  Stand to Sit: Contact guard assistance  Balance:   Sitting - Static: Good (unsupported)  Sitting - Dynamic: Good (unsupported)  Standing: Impaired; With support  Standing - Static: Fair(Minus)  Standing - Dynamic : Poor(Plus/Fair Minus)  Ambulation/Gait Training:  Distance (ft): 100 Feet (ft)  Assistive Device: Walker, rolling  Ambulation - Level of Assistance: Contact guard assistance;Minimal assistance    Gait Abnormalities: Decreased step clearance, lost of balance/gait deviation to right side  Base of Support: Center of gravity altered  Speed/Fabiola: Fluctuations  Step Length: Left shortened;Right shortened    Pain:  Pain level pre-treatment: 0/10   Pain level post-treatment: 0*/10   Pain Intervention(s) : Medication (see MAR); Rest, Ice, Repositioning  Response to intervention: Nurse notified, See doc flow    Activity Tolerance:  Fair    Please refer to the flowsheet for vital signs taken during this treatment.   After treatment:   []         Patient left in no apparent distress sitting up in chair  [x]         Patient left in no apparent distress in bed  [x]         Call bell left within reach  [x]         Nursing notified  []         Caregiver present  [x] Bed alarm activated  []         SCDs applied    COMMUNICATION/EDUCATION:   [x]         Role of Physical Therapy in the acute care setting. [x]         Fall prevention education was provided and the patient/caregiver indicated understanding. [x]         Patient/family have participated as able in goal setting and plan of care. [x]         Patient/family agree to work toward stated goals and plan of care. []         Patient understands intent and goals of therapy, but is neutral about his/her participation. []         Patient is unable to participate in goal setting/plan of care: ongoing with therapy staff.  []         Other:     Thank you for this referral.  Victorino Farris, PT   Time Calculation: 30 mins      Eval Complexity: History: LOW Complexity : Zero comorbidities / personal factors that will impact the outcome / POCExam:MEDIUM Complexity : 3 Standardized tests and measures addressing body structure, function, activity limitation and / or participation in recreation  Presentation: LOW Complexity : Stable, uncomplicated  Clinical Decision Making:Medium Complexity    Overall Complexity:LOW

## 2020-12-13 PROBLEM — I63.9 ACUTE CVA (CEREBROVASCULAR ACCIDENT) (HCC): Status: ACTIVE | Noted: 2020-12-13

## 2020-12-13 PROBLEM — I69.30 CHRONIC CEREBROVASCULAR ACCIDENT (CVA): Status: ACTIVE | Noted: 2020-12-13

## 2020-12-13 PROBLEM — I62.9 INTRACRANIAL BLEEDING (HCC): Status: ACTIVE | Noted: 2020-12-13

## 2020-12-13 LAB
ALBUMIN SERPL-MCNC: 3.2 G/DL (ref 3.4–5)
ALBUMIN/GLOB SERPL: 0.9 {RATIO} (ref 0.8–1.7)
ALP SERPL-CCNC: 82 U/L (ref 45–117)
ALT SERPL-CCNC: 19 U/L (ref 13–56)
ANION GAP SERPL CALC-SCNC: 6 MMOL/L (ref 3–18)
AST SERPL-CCNC: 13 U/L (ref 10–38)
BASOPHILS # BLD: 0 K/UL (ref 0–0.1)
BASOPHILS NFR BLD: 0 % (ref 0–2)
BILIRUB SERPL-MCNC: 0.8 MG/DL (ref 0.2–1)
BUN SERPL-MCNC: 16 MG/DL (ref 7–18)
BUN/CREAT SERPL: 18 (ref 12–20)
CALCIUM SERPL-MCNC: 9.1 MG/DL (ref 8.5–10.1)
CHLORIDE SERPL-SCNC: 104 MMOL/L (ref 100–111)
CO2 SERPL-SCNC: 30 MMOL/L (ref 21–32)
CREAT SERPL-MCNC: 0.9 MG/DL (ref 0.6–1.3)
DIFFERENTIAL METHOD BLD: ABNORMAL
EOSINOPHIL # BLD: 0.1 K/UL (ref 0–0.4)
EOSINOPHIL NFR BLD: 1 % (ref 0–5)
ERYTHROCYTE [DISTWIDTH] IN BLOOD BY AUTOMATED COUNT: 13.6 % (ref 11.6–14.5)
GLOBULIN SER CALC-MCNC: 3.4 G/DL (ref 2–4)
GLUCOSE SERPL-MCNC: 97 MG/DL (ref 74–99)
HCT VFR BLD AUTO: 38.3 % (ref 35–45)
HGB BLD-MCNC: 13.2 G/DL (ref 12–16)
LYMPHOCYTES # BLD: 3.2 K/UL (ref 0.9–3.6)
LYMPHOCYTES NFR BLD: 46 % (ref 21–52)
MAGNESIUM SERPL-MCNC: 1.9 MG/DL (ref 1.6–2.6)
MCH RBC QN AUTO: 29.3 PG (ref 24–34)
MCHC RBC AUTO-ENTMCNC: 34.5 G/DL (ref 31–37)
MCV RBC AUTO: 85.1 FL (ref 74–97)
MONOCYTES # BLD: 0.5 K/UL (ref 0.05–1.2)
MONOCYTES NFR BLD: 7 % (ref 3–10)
NEUTS SEG # BLD: 3.2 K/UL (ref 1.8–8)
NEUTS SEG NFR BLD: 46 % (ref 40–73)
PLATELET # BLD AUTO: 140 K/UL (ref 135–420)
PMV BLD AUTO: 12.7 FL (ref 9.2–11.8)
POTASSIUM SERPL-SCNC: 3.3 MMOL/L (ref 3.5–5.5)
PROT SERPL-MCNC: 6.6 G/DL (ref 6.4–8.2)
RBC # BLD AUTO: 4.5 M/UL (ref 4.2–5.3)
SODIUM SERPL-SCNC: 140 MMOL/L (ref 136–145)
WBC # BLD AUTO: 6.9 K/UL (ref 4.6–13.2)

## 2020-12-13 PROCEDURE — 97116 GAIT TRAINING THERAPY: CPT

## 2020-12-13 PROCEDURE — 99218 HC RM OBSERVATION: CPT

## 2020-12-13 PROCEDURE — 80053 COMPREHEN METABOLIC PANEL: CPT

## 2020-12-13 PROCEDURE — 2709999900 HC NON-CHARGEABLE SUPPLY

## 2020-12-13 PROCEDURE — 85025 COMPLETE CBC W/AUTO DIFF WBC: CPT

## 2020-12-13 PROCEDURE — 74011250637 HC RX REV CODE- 250/637: Performed by: HOSPITALIST

## 2020-12-13 PROCEDURE — 83735 ASSAY OF MAGNESIUM: CPT

## 2020-12-13 PROCEDURE — 74011000250 HC RX REV CODE- 250: Performed by: HOSPITALIST

## 2020-12-13 PROCEDURE — 65660000000 HC RM CCU STEPDOWN

## 2020-12-13 PROCEDURE — 74011250637 HC RX REV CODE- 250/637: Performed by: INTERNAL MEDICINE

## 2020-12-13 PROCEDURE — 36415 COLL VENOUS BLD VENIPUNCTURE: CPT

## 2020-12-13 RX ORDER — POTASSIUM CHLORIDE 20 MEQ/1
40 TABLET, EXTENDED RELEASE ORAL 2 TIMES DAILY WITH MEALS
Status: COMPLETED | OUTPATIENT
Start: 2020-12-13 | End: 2020-12-13

## 2020-12-13 RX ORDER — OXYCODONE AND ACETAMINOPHEN 5; 325 MG/1; MG/1
1 TABLET ORAL
Status: DISCONTINUED | OUTPATIENT
Start: 2020-12-13 | End: 2020-12-16 | Stop reason: HOSPADM

## 2020-12-13 RX ORDER — LORATADINE 10 MG/1
10 TABLET ORAL DAILY
Status: DISCONTINUED | OUTPATIENT
Start: 2020-12-13 | End: 2020-12-16 | Stop reason: HOSPADM

## 2020-12-13 RX ADMIN — ACETAMINOPHEN 650 MG: 325 TABLET, FILM COATED ORAL at 17:07

## 2020-12-13 RX ADMIN — NITROGLYCERIN 1 INCH: 20 OINTMENT TOPICAL at 22:43

## 2020-12-13 RX ADMIN — OXYCODONE HYDROCHLORIDE AND ACETAMINOPHEN 1 TABLET: 5; 325 TABLET ORAL at 22:39

## 2020-12-13 RX ADMIN — ACETAMINOPHEN 650 MG: 325 TABLET, FILM COATED ORAL at 03:52

## 2020-12-13 RX ADMIN — NITROGLYCERIN 1 INCH: 20 OINTMENT TOPICAL at 16:13

## 2020-12-13 RX ADMIN — NITROGLYCERIN 0.5 INCH: 20 OINTMENT TOPICAL at 01:51

## 2020-12-13 RX ADMIN — LABETALOL HYDROCHLORIDE 5 MG: 5 INJECTION INTRAVENOUS at 18:42

## 2020-12-13 RX ADMIN — LABETALOL HYDROCHLORIDE 5 MG: 5 INJECTION INTRAVENOUS at 18:22

## 2020-12-13 RX ADMIN — CHLORTHALIDONE 25 MG: 25 TABLET ORAL at 08:33

## 2020-12-13 RX ADMIN — ATORVASTATIN CALCIUM 80 MG: 20 TABLET, FILM COATED ORAL at 22:42

## 2020-12-13 RX ADMIN — ACETAMINOPHEN 650 MG: 325 TABLET, FILM COATED ORAL at 11:45

## 2020-12-13 RX ADMIN — DOCUSATE SODIUM 100 MG: 100 CAPSULE, LIQUID FILLED ORAL at 22:43

## 2020-12-13 RX ADMIN — LOSARTAN POTASSIUM 50 MG: 50 TABLET ORAL at 08:33

## 2020-12-13 RX ADMIN — ASPIRIN 81 MG CHEWABLE TABLET 81 MG: 81 TABLET CHEWABLE at 08:33

## 2020-12-13 RX ADMIN — POTASSIUM CHLORIDE 40 MEQ: 1500 TABLET, EXTENDED RELEASE ORAL at 08:33

## 2020-12-13 RX ADMIN — POTASSIUM CHLORIDE 40 MEQ: 1500 TABLET, EXTENDED RELEASE ORAL at 17:07

## 2020-12-13 RX ADMIN — LORATADINE 10 MG: 10 TABLET ORAL at 11:45

## 2020-12-13 NOTE — PROGRESS NOTES
Internal Medicine Progress Note    Patient's Name: Cory Singleton  Admit Date: 12/12/2020  Length of Stay: 0      Assessment/Plan     Active Hospital Problems    Diagnosis Date Noted    Intracranial bleeding ([address] 12/13/2020    Acute CVA (cerebrovascular accident) ([address] 12/13/2020    Chronic cerebrovascular accident (CVA) 12/13/2020    TIA (transient ischemic attack) 12/12/2020    Hypertensive crisis 12/12/2020    Imbalance 12/12/2020    Numbness and tingling in right hand 12/12/2020    HLD (hyperlipidemia) 12/12/2020    Lacunar infarction ([facility] [address] 12/12/2020     Old Bilateral Thalamic Lacunar Infarctions.       - MRI w/ probable tiny infarcts, evidence of chronic hypertensive related effects (pt admits to non-compliance)  - Cont statin  - Cont ASA alone given microbleeds as opposed to DAPT  - CTA w/o evidence of intrancranial stenosis  - Will start on chlorthalidone/losartan today for BP control, she was only on atenolol (which is quite poor at BP control) and non-compliant with it anyway   - PT/OT recommends inpatient rehab  - Cont acceptable home medications for chronic conditions   - DVT protocol    I have personally reviewed all pertinent labs and films that have officially resulted over the last 24 hours. I have personally checked for all pending labs that are awaiting final results.     Subjective     Pt s/e @ bedside  No major events overnight  C/o headache, requesting loratidine  Still w/ numbness R hand  Denies CP or SOB    Objective     Visit Vitals  BP (!) 164/108   Pulse 75   Temp 98.1 °F (36.7 °C)   Resp 18   Ht 5' 4\" (1.626 m)   Wt 76.2 kg (168 lb)   SpO2 95%   BMI 28.84 kg/m²       Physical Exam:  General Appearance: NAD, conversant  Lungs: CTA with normal respiratory effort  CV: RRR, no m/r/g  Abdomen: soft, non-tender, normal bowel sounds  Extremities: no cyanosis, no peripheral edema  Neuro: No focal deficits, + sensory deficit R forearm/hand     Lab/Data Reviewed:  BMP:   Lab Results Component Value Date/Time     12/13/2020 02:40 AM    K 3.3 (L) 12/13/2020 02:40 AM     12/13/2020 02:40 AM    CO2 30 12/13/2020 02:40 AM    AGAP 6 12/13/2020 02:40 AM    GLU 97 12/13/2020 02:40 AM    BUN 16 12/13/2020 02:40 AM    CREA 0.90 12/13/2020 02:40 AM    GFRAA >60 12/13/2020 02:40 AM    GFRNA >60 12/13/2020 02:40 AM     CBC:   Lab Results   Component Value Date/Time    WBC 6.9 12/13/2020 02:40 AM    HGB 13.2 12/13/2020 02:40 AM    HCT 38.3 12/13/2020 02:40 AM     12/13/2020 02:40 AM       Imaging Reviewed:  No results found.     Medications Reviewed:  Current Facility-Administered Medications   Medication Dose Route Frequency    nitroglycerin (NITROBID) 2 % ointment 1 Inch  1 Inch Topical Q6H PRN    potassium chloride (K-DUR, KLOR-CON) SR tablet 40 mEq  40 mEq Oral BID WITH MEALS    aspirin chewable tablet 81 mg  81 mg Oral DAILY    atorvastatin (LIPITOR) tablet 80 mg  80 mg Oral QHS    docusate sodium (COLACE) capsule 100 mg  100 mg Oral BID PRN    melatonin tablet 12 mg  12 mg Oral QHS PRN    albuterol-ipratropium (DUO-NEB) 2.5 MG-0.5 MG/3 ML  3 mL Nebulization Q6H PRN    pantoprazole (PROTONIX) injection 40 mg  40 mg IntraVENous DAILY PRN    ondansetron (ZOFRAN) injection 4 mg  4 mg IntraVENous Q4H PRN    acetaminophen (TYLENOL) tablet 650 mg  650 mg Oral Q6H PRN    hydrOXYzine HCL (ATARAX) tablet 25 mg  25 mg Oral TID PRN    chlorthalidone (HYGROTON) tablet 25 mg  25 mg Oral DAILY    losartan (COZAAR) tablet 50 mg  50 mg Oral DAILY    labetaloL (NORMODYNE;TRANDATE) injection 5 mg  5 mg IntraVENous Q10MIN PRN           Sridhar Cárdenas DO  Internal Medicine, Hospitalist  Pager: [phone]  [facility]

## 2020-12-13 NOTE — PROGRESS NOTES
Problem: Self Care Deficits Care Plan (Adult)  Goal: *Acute Goals and Plan of Care (Insert Text)  Description: Occupational Therapy Goals  Initiated 12/12/2020 within 7 day(s). 1.  Patient will perform grooming with modified independence in stance at sink with good balance with < 2 seated rest breaks. 2.  Patient will perform bathing with modified independence. 3.  Patient will perform upper body dressing and lower body dressing with modified independence. 4.  Patient will perform toilet transfers with modified independence using RW with good balance. 5.  Patient will perform all aspects of toileting with modified independence. 6.  Patient will participate in upper extremity therapeutic exercise/activities with modified independence for 8 minutes. 7.  Patient will utilize energy conservation techniques during functional activities with min verbal cues. Prior Level of Function: independent with ADLs and functional mobility w/o AD, driving     OCCUPATIONAL THERAPY EVALUATION    Patient: Baldemar Fuchs (15 y.o. female)  Date: 12/12/2020  Primary Diagnosis: Hypertensive crisis [I16.9]  TIA (transient ischemic attack) [G45.9]  Numbness and tingling in right hand [R20.0, R20.2]  Imbalance [R26.89]        Precautions:   Fall  PLOF: see above    ASSESSMENT :  Nursing/RN cleared for pt to participate in OT evaluation and tx session. Patient  admitted to hospital with c/o right side weakness and right arm numbness. Patient presents lying supine in bed, A & O x 4, no c/o pain. Bed mobility: supv supine <-> sit edge of bed. BUE AROM: WFL, MMT  WFL, noted intermittent slight tremor in right hand. LB dress: cga doff and don slipper sock seated edge of bed w/ Good sitting balance using crossing leg method.  Toilet transfer: CGA using RW with noted right sided lean, vc's to correct for upright posture, vc's for safety awareness with RW e.g. keep in front of self for balance and fall prevention, cga toilet tasks and washing hands in stance at sink with RW. Patient resting semi-reclined in bed at end of tx session Call bell within reach & pt verbalized understanding and provided return demonstration to utilize for assist e.g. functional transfers in order to prevent falls. Nursing notified of pt's level of assist with toilet transfer using RW and right side lean requiring vc's to correct. OT recommending IPR to address stated deficits, pt reports concern for sister with whom she lives with is not well with possible early onset dementia and will not allow anyone in the house to assist her. Patient will benefit from skilled intervention to address the above impairments. Patient's rehabilitation potential is considered to be Excellent  Factors which may influence rehabilitation potential include:   []             None noted  []             Mental ability/status  []             Medical condition  [x]             Home/family situation and support systems  [x]             Safety awareness  []             Pain tolerance/management  []             Other:      PLAN :  Recommendations and Planned Interventions:   [x]               Self Care Training                  [x]      Therapeutic Activities  [x]               Functional Mobility Training   []      Cognitive Retraining  [x]               Therapeutic Exercises           [x]      Endurance Activities  [x]               Balance Training                    [x]      Neuromuscular Re-Education  []               Visual/Perceptual Training     [x]      Home Safety Training  [x]               Patient Education                   [x]      Family Training/Education  []               Other (comment):    Frequency/Duration: Patient will be followed by occupational therapy 3-5 times a week to address goals.   Discharge Recommendations: Inpatient Rehab  Further Equipment Recommendations for Discharge: shower chair, grab bar and rolling walker     SUBJECTIVE:   Patient stated Kristinjacky Tinsley had all of those things that were my mothers, but I gave them all away, I recently gave away her walker.     OBJECTIVE DATA SUMMARY:     Past Medical History:   Diagnosis Date    History of dental abscess     HLD (hyperlipidemia)     Hypertension     Non-Complicance with Hypertension medications (Atenolol)    Lacunar infarction ([facility] [address]     Bilateral Thalamic Infarctions    Lichen sclerosus     of genitalia    Nephrolithiasis     Osteopenia 09/13/2019    By DEXA Scan on 3/46/3333    Umbilical hernia     Small, Fat-containing Only    Vitamin D deficiency 10/08/2015   History reviewed. No pertinent surgical history. Barriers to Learning/Limitations: None  Compensate with: visual, verbal, tactile, kinesthetic cues/model    Home Situation:   Home Situation  Home Environment: Private residence  # Steps to Enter: 3  Rails to Enter: Yes  Hand Rails : Bilateral  Wheelchair Ramp: Yes  One/Two Story Residence: One story  Living Alone: No  Support Systems: Family member(s)  Patient Expects to be Discharged to[de-identified] Unknown  Current DME Used/Available at Home: None  Tub or Shower Type: Tub/Shower combination  [x]  Right hand dominant   []  Left hand dominant    Cognitive/Behavioral Status:  Neurologic State: Alert; Appropriate for age  Orientation Level: Oriented X4  Cognition: Follows commands  Safety/Judgement: Fall prevention    Skin: appears intact  Edema: none noted    Vision/Perceptual:  appears intact    Coordination: BUE  Coordination: Within functional limits(BUEs)  Fine Motor Skills-Upper: Left Intact; Right Intact    Gross Motor Skills-Upper: Left Intact; Right Intact    Balance:  Sitting: Intact  Sitting - Static: Good (unsupported)  Sitting - Dynamic: Good (unsupported)  Standing: Impaired; With support  Standing - Static: Fair(-)  Standing - Dynamic : Poor(+)    Strength: BUE  Strength:  Within functional limits(BUEs)    Tone & Sensation: BUE  Tone: Normal(BUEs)  Sensation: Intact(BUEs)    Range of Motion: BUE  AROM: Within functional limits(BUEs)    Functional Mobility and Transfers for ADLs:  Bed Mobility:  Rolling: Modified independent  Supine to Sit: Supervision  Sit to Supine: Supervision     Transfers:  Sit to Stand: Contact guard assistance  Stand to Sit: Contact guard assistance      Toilet Transfer : Contact guard assistance      Bathroom Mobility: Contact guard assistance    ADL Assessment:   Feeding: Modified independent    Oral Facial Hygiene/Grooming: Stand-by assistance    Bathing: Contact guard assistance    Upper Body Dressing: Contact guard assistance    Lower Body Dressing: Contact guard assistance    Toileting: Contact guard assistance      ADL Intervention:   Bed mobility: supv supine <-> sit edge of bed. BUE AROM: WFL, MMT  WFL, noted intermittent slight tremor in right hand. LB dress: cga doff and don slipper sock seated edge of bed w/ Good sitting balance using crossing leg method. Toilet transfer: CGA using RW with noted right sided lean, vc's to correct for upright posture, vc's for safety awareness with RW e.g. keep in front of self for balance and fall prevention, cga toilet tasks and washing hands in stance at sink with RW. Patient resting semi-reclined in bed at end of tx session Call bell within reach & pt verbalized understanding and provided return demonstration to utilize for assist e.g. functional transfers in order to prevent falls. Nursing notified of pt's level of assist with toilet transfer using RW and right side lean requiring vc's to correct. OT recommending IPR to address stated deficits, pt reports concern for sister with whom she lives with is not well with possible early onset dementia and will not allow anyone in the house to assist her. Cognitive Retraining  Safety/Judgement: Fall prevention    Pain:  Pain level pre-treatment: 0/10   Pain level post-treatment: 0/10   Pain Intervention(s): Medication (see MAR);  Rest, Ice, Repositioning   Response to intervention: Nurse notified, See doc flow    Activity Tolerance:   fair  Please refer to the flowsheet for vital signs taken during this treatment. After treatment:   [] Patient left in no apparent distress sitting up in chair  [x] Patient left in no apparent distress in bed  [x] Call bell left within reach  [x] Nursing notified  [] Caregiver present  [] Bed alarm activated    COMMUNICATION/EDUCATION:   [x] Role of Occupational Therapy in the acute care setting  [x] Home safety education was provided and the patient/caregiver indicated understanding. [x] Patient/family have participated as able in goal setting and plan of care. [x] Patient/family agree to work toward stated goals and plan of care. [] Patient understands intent and goals of therapy, but is neutral about his/her participation. [] Patient is unable to participate in goal setting and plan of care. Thank you for this referral.  Martina Jaramillo  Time Calculation: 36 mins    Eval Complexity: History: MEDIUM Complexity : Expanded review of history including physical, cognitive and psychosocial  history ; Examination: MEDIUM Complexity : 3-5 performance deficits relating to physical, cognitive , or psychosocial skils that result in activity limitations and / or participation restrictions; Decision Making:MEDIUM Complexity : Patient may present with comorbidities that affect occupational performnce.  Miniml to moderate modification of tasks or assistance (eg, physical or verbal ) with assesment(s) is necessary to enable patient to complete evaluation

## 2020-12-13 NOTE — PROGRESS NOTES
Informed by MD that therapy is rec acute in-pt rehab. Met with pt & made her aware of above, she is agreeable to rehab, would like [facility] acute rehab. Posted in [facility]. Elba Moore RN,ext [phone].

## 2020-12-13 NOTE — PROGRESS NOTES
Physical Therapy Goals  Initiated 12/12/2020 and to be accomplished within 7 day(s)  1. Patient will move from supine to sit and sit to supine , scoot up and down, and roll side to side in bed with modified independence. 2.  Patient will transfer from bed to chair and chair to bed with modified independence using the least restrictive device. 3.  Patient will perform sit to stand with modified independence. 4.  Patient will ambulate with modified independence for >/=150 feet with the least restrictive device. 5.  Patient will ascend/descend 3 stairs with handrail(s) with modified independence. PHYSICAL THERAPY TREATMENT    Patient: Teena Mota (78 y.o. female)  Date: 12/13/2020  Diagnosis: Hypertensive crisis [I16.9]  TIA (transient ischemic attack) [G45.9]  Numbness and tingling in right hand [R20.0, R20.2]  Imbalance [R26.89]  Acute CVA (cerebrovascular accident) ([address] [I63.9]   Acute CVA (cerebrovascular accident) ([address]       Precautions: Fall  PLOF: Independent    ASSESSMENT:  Permissive HTN per nurse Mika Morel. OK to work with patient. Patient supine in bed on initial contact. Supervision with bed mobility. CGA sit <>stand requiring verbal and tactile cues to push up from bed versus pull up from walker to complete sit to stand for safety. Patient ambulated with rolling walker 130 feet x 2 with sit rest between and CGA. Balance much improved compared to yesterday, with only occasional slight deviation to right noted during gait. Practiced going up and down 6 steps sideways holding onto 1 rail with both hands requiring CGA for safety. Per patient, has a ramp and does not need to go up and down any steps at home. Patient assisted back to bed at end of treatment session. BP at rest 186/116. BP after ambulation 157/111 mmHg. Nurse Helen Carballo made aware.     Progression toward goals:   [x]      Improving appropriately and progressing toward goals  []      Improving slowly and progressing toward goals  []      Not making progress toward goals and plan of care will be adjusted     PLAN:  Patient continues to benefit from skilled intervention to address the above impairments. Continue treatment per established plan of care. Discharge Recommendations:  Inpatient Rehab versus home health PT depending on progress and how long patient will be staying in hospital  Further Equipment Recommendations for Discharge:  rolling walker     SUBJECTIVE:   Patient stated Feel balance is getting better.     OBJECTIVE DATA SUMMARY:   Critical Behavior:  Neurologic State: Alert, Eyes open spontaneously  Orientation Level: Oriented X4  Cognition: Follows commands  Safety/Judgement: Awareness of environment, Fall prevention  Functional Mobility Training:  Bed Mobility:  Supine to Sit: Supervision  Sit to Supine: Supervision  Transfers:  Sit to Stand: Contact guard assistance  Stand to Sit: Contact guard assistance  Balance:  Sitting: Intact  Sitting - Static: Good (unsupported)  Sitting - Dynamic: Good (unsupported)  Standing: Impaired  Standing - Static: Fair  Standing - Dynamic : Fair(Minus)   Ambulation/Gait Training:  Distance (ft): 130 Feet (ft)(+ 130 feet sit rest between)  Assistive Device: Walker, rolling  Ambulation - Level of Assistance: Contact guard assistance  Gait Abnormalities: Decreased step clearance  Speed/Fabiola: Pace decreased (<100 feet/min)  Step Length: Left shortened;Right shortened  Stairs:  Number of Stairs Trained: 6  Stairs - Level of Assistance: Contact guard assistance  Rail Use: Right (going up, side stepping, holding onto rail with both hands)    Pain:  Pain level pre-treatment: 0/10  Pain level post-treatment: 0/10   Pain Intervention(s): Medication (see MAR); Rest, Ice, Repositioning  Response to intervention: Nurse notified, See doc flow    Activity Tolerance:  Good    Please refer to the flowsheet for vital signs taken during this treatment.   After treatment:   [] Patient left in no apparent distress sitting up in chair  [x] Patient left in no apparent distress in bed  [x] Call bell left within reach  [x] Nursing notified  [] Caregiver present  [] Bed alarm activated  [] SCDs applied      COMMUNICATION/EDUCATION:   []         Role of Physical Therapy in the acute care setting. []         Fall prevention education was provided and the patient/caregiver indicated understanding. [x]         Patient/family have participated as able in working toward goals and plan of care. []         Patient/family agree to work toward stated goals and plan of care. []         Patient understands intent and goals of therapy, but is neutral about his/her participation.   []         Patient is unable to participate in stated goals/plan of care: ongoing with therapy staff.  []         Other:        Josephine Arizmendi, PT   Time Calculation: 28 mins

## 2020-12-13 NOTE — ROUTINE PROCESS
8656 Received report from [facility] RN. Patient alert and oriented x 4. NIH done. Complaint of tingling on right hand and sinus headache and sinus drips. 1200 Patient ambulated in the hallway with Rubén Erasto and PT.     1800 Patient's BP persistently elevated - PRN meds given. 1850 BP rechecked 172/106.    1918 Bedside and Verbal shift change report given to [facility] RN (oncoming nurse) by Jacquelyn Mederos RN (offgoing nurse). Report included the following information SBAR, Kardex, Intake/Output, MAR, Recent Results and Cardiac Rhythm NSR.

## 2020-12-13 NOTE — PROGRESS NOTES
2015 Bedside report given by Kacey Altamirano. Pt in bed bed awake alert and oriented x 4 c/o of headache on the frontal right side 8/10. Pt states that she had tooth  Abscess [facility] at [facility]. Right side of jaw noted a little bit swollen. Pt's BP still elevated and had been given labetalol earlier on by the off going nurse will recheck. Dual NIH done no changes in neurological status. HOB elevated, bed low and in locked position. Call light and personal items for frequent use within reach. 2030- Pt given tylenol 650 mg po for headache. Vitals taken /109   rechecked manually 175/110- Pt noted to be a little bit anxious about BP she states anxiously that it is going up and up. Pt advised that BP is trending down compared to admission. It is high but does not meet the criteria for Labetalol for now. Pt educated on the need to remain calm, take it easy, as this nurse continues to monitor BP/ NIH.  2130- Pt states pain down to 7/10- pt says it is not as bad as it was before she took the tylenol. Pt expressed that the headache might be from her sinus and as a result of thinking too much. Pt said will try to relax and take it easy. Pt's feelings acknowledged. 2215- NIH completed no changes. 2230- Pt assisted ambulated to bathroom with walker, voided and then back to bed made comfortable. Pt. watching tv, bed alarm activated for safety. Andrés Egan on the unit notified about pt's blood pressure which is still high but does not meet criteria for Labetalol. Pt states she takes atenolol. Dr. Dorian Lorenzo check and see what to give. Pt assisted to bathroom and back to bed. 36- Written Order for nitro ointment noted and acknowledged. 0150- /110 HR 64. Pt given  NItro 0.5 inch on the right upper chest. Will jaylen sess BP.   0530- Noted 3.3 potassium level. 3650- Dr. Noah Egan on the unit was notified about potassium 3.3 said will put orders in.  0549= Noted orders written for nitro I inch . , potasium and mag.     BP 144/103 HR 60. Pt awake resting watching tv states that she still has headache.  0726- Bedside and Verbal shift change report given to Kerri Barron RN (oncoming nurse) by Claribel Schrader RN (offgoing nurse). Report included the following information SBAR, Kardex, Intake/Output, MAR and Recent Results.

## 2020-12-14 ENCOUNTER — APPOINTMENT (OUTPATIENT)
Dept: CT IMAGING | Age: 65
DRG: 065 | End: 2020-12-14
Attending: INTERNAL MEDICINE
Payer: MEDICARE

## 2020-12-14 PROCEDURE — 65660000000 HC RM CCU STEPDOWN

## 2020-12-14 PROCEDURE — 2709999900 HC NON-CHARGEABLE SUPPLY

## 2020-12-14 PROCEDURE — 70450 CT HEAD/BRAIN W/O DYE: CPT

## 2020-12-14 PROCEDURE — 74011250637 HC RX REV CODE- 250/637: Performed by: INTERNAL MEDICINE

## 2020-12-14 PROCEDURE — 97535 SELF CARE MNGMENT TRAINING: CPT

## 2020-12-14 PROCEDURE — 97112 NEUROMUSCULAR REEDUCATION: CPT

## 2020-12-14 PROCEDURE — 97116 GAIT TRAINING THERAPY: CPT

## 2020-12-14 PROCEDURE — 74011250637 HC RX REV CODE- 250/637: Performed by: HOSPITALIST

## 2020-12-14 RX ADMIN — ATORVASTATIN CALCIUM 80 MG: 20 TABLET, FILM COATED ORAL at 22:30

## 2020-12-14 RX ADMIN — CHLORTHALIDONE 25 MG: 25 TABLET ORAL at 08:57

## 2020-12-14 RX ADMIN — OXYCODONE HYDROCHLORIDE AND ACETAMINOPHEN 1 TABLET: 5; 325 TABLET ORAL at 09:03

## 2020-12-14 RX ADMIN — LOSARTAN POTASSIUM 50 MG: 50 TABLET ORAL at 09:03

## 2020-12-14 RX ADMIN — ASPIRIN 81 MG CHEWABLE TABLET 81 MG: 81 TABLET CHEWABLE at 08:57

## 2020-12-14 RX ADMIN — LORATADINE 10 MG: 10 TABLET ORAL at 08:57

## 2020-12-14 RX ADMIN — OXYCODONE HYDROCHLORIDE AND ACETAMINOPHEN 1 TABLET: 5; 325 TABLET ORAL at 22:30

## 2020-12-14 RX ADMIN — OXYCODONE HYDROCHLORIDE AND ACETAMINOPHEN 1 TABLET: 5; 325 TABLET ORAL at 04:57

## 2020-12-14 NOTE — CONSULTS
[facility] - Consult Note    Consultation request by Mery Jain DO for advice/opinion related to evaluating Hypertensive crisis [I16.9]  TIA (transient ischemic attack) [G45.9]  Numbness and tingling in right hand [R20.0, R20.2]  Imbalance [R26.89]  Acute CVA (cerebrovascular accident) [facility]) [I63.9]    Date of  Admission: 12/12/2020 12:48 AM   Primary Care Physician:  James Adams MD      I saw, evaluated, interviewed and examined the patient personally. I agree with the findings and plan of care as documented below with PA-C note  Patient admitted with CVA. CT and MRI finding noted. Concern for possible embolic event  Telemetry monitor without any evidence of atrial fibrillation. Agree with 30-day event monitor to rule out any underlying arrhythmia. Patient denies any prior history of palpitation, unstable angina or any heart failure  Echo findings noted  We will arrange for outpatient follow-up and 30-day event monitor  We will be available as needed. Thank you for consult.   Call us back with any question    Ewa Grant MD        Assessment:     - Acute CVA, admitted with right hand tingling, N/V; Head CT and MRI results noted; CTA w/o evidence of intracranial stenosis, negative troponin x2; ECG sinus bradycardia, LVH   - TIA  - Chronic CVA   - Lacunar infarct   - Hypertension, uncontrolled, systolic as high as 255J on admission   - ECHO (12/2020) EF 60-65%, Moderate concentric hypertrophy, No shunting or PFO noted, Grade 1 diastolic dysfunction, PASP 18 mmHg  - Hyperlipidemia   - Non-compliance hx      Plan:     - ECHO with bubble w/o evidence of shunting/PFO/septal defect   -BP systolic as high as 835E on admission, now in 150s   -Chlorthalidone and Losartan started by primary team for BP control   -tele reviewed, no arrhythmia/AFIB/flutter noted, HR stable   -Continue high intensity statin and ASA   - 30 day event monitor as outpatient   - continue telemetry   - TSH normal      History of Present Illness: This is a 76 y.o. female admitted for Hypertensive crisis [I16.9]  TIA (transient ischemic attack) [G45.9]  Numbness and tingling in right hand [R20.0, R20.2]  Imbalance [R26.89]  Acute CVA (cerebrovascular accident) ([facility] [address] [I63.9]. Patient complains of:  CVA, hypertension     Maria Del Rosario Byrnes is a 76 y.o. female, PMHx as stated above, who we are seeing in consult for hypertension in the setting of CVA. Patient presented to the ER 12/12/20 with c/o right arm tingling, N/V and feeling off balance x 1 day. She felt that her blood pressure was high at that time. Patient reports being non compliant with her medications and says she has been for years. She admits to not eating healthy and taking her medications as prescribed. She denies chest pain/pressure, palpitations, tachycardia, dizziness, lightheadedness, near syncope or syncope, diaphoresis, PND, orthopnea. She does report a lot of stress in her life currently. Cardiac risk factors: dyslipidemia, hypertension, stress, post-menopausal      Review of Symptoms:  Except as stated above include:  Constitutional:  negative  Respiratory:  negative  Cardiovascular:  negative  Gastrointestinal: negative  Genitourinary:  negative  Musculoskeletal:  Negative  Neurological:  Negative  Dermatological:  Negative  Endocrinological: Negative  Psychological:  Negative    A comprehensive review of systems was negative except for that written in the HPI.      Past Medical History:     Past Medical History:   Diagnosis Date    History of dental abscess     HLD (hyperlipidemia)     Hypertension     Non-Complicance with Hypertension medications (Atenolol)    [facility]     Bilateral Thalamic Infarctions    Lichen sclerosus     of genitalia    Nephrolithiasis     Osteopenia 09/13/2019    By DEXA Scan on 1/84/7033    Umbilical hernia     Small, Fat-containing Only    Vitamin D deficiency 10/08/2015 Social History:     Social History     Socioeconomic History    Marital status: SINGLE     Spouse name: Not on file    Number of children: Not on file    Years of education: Not on file    Highest education level: Not on file   Tobacco Use    Smoking status: Never Smoker    Smokeless tobacco: Never Used   Substance and Sexual Activity    Alcohol use: No    Drug use: No   Other Topics Concern        Family History:     Family History   Problem Relation Age of Onset    Hypertension Mother     Hypertension Maternal Aunt     Stroke Maternal Aunt     Hypertension Maternal Uncle     Stroke Maternal Uncle     Hypertension Maternal Uncle     Stroke Maternal Uncle         Medications:   No Known Allergies     Current Facility-Administered Medications   Medication Dose Route Frequency    nitroglycerin (NITROBID) 2 % ointment 1 Inch  1 Inch Topical Q6H PRN    loratadine (CLARITIN) tablet 10 mg  10 mg Oral DAILY    oxyCODONE-acetaminophen (PERCOCET) 5-325 mg per tablet 1 Tab  1 Tab Oral Q4H PRN    aspirin chewable tablet 81 mg  81 mg Oral DAILY    atorvastatin (LIPITOR) tablet 80 mg  80 mg Oral QHS    docusate sodium (COLACE) capsule 100 mg  100 mg Oral BID PRN    melatonin tablet 12 mg  12 mg Oral QHS PRN    albuterol-ipratropium (DUO-NEB) 2.5 MG-0.5 MG/3 ML  3 mL Nebulization Q6H PRN    pantoprazole (PROTONIX) injection 40 mg  40 mg IntraVENous DAILY PRN    ondansetron (ZOFRAN) injection 4 mg  4 mg IntraVENous Q4H PRN    acetaminophen (TYLENOL) tablet 650 mg  650 mg Oral Q6H PRN    hydrOXYzine HCL (ATARAX) tablet 25 mg  25 mg Oral TID PRN    chlorthalidone (HYGROTON) tablet 25 mg  25 mg Oral DAILY    losartan (COZAAR) tablet 50 mg  50 mg Oral DAILY    labetaloL (NORMODYNE;TRANDATE) injection 5 mg  5 mg IntraVENous Q10MIN PRN         Physical Exam:     Visit Vitals  BP (!) 156/91 (BP 1 Location: Left arm, BP Patient Position: At rest)   Pulse 63   Temp 98.8 °F (37.1 °C)   Resp 18   Ht 5' 4\" (1.626 m)   Wt 168 lb (76.2 kg)   SpO2 100%   BMI 28.84 kg/m²     BP Readings from Last 3 Encounters:   12/14/20 (!) 156/91   11/26/15 (!) 175/106   11/05/14 (!) 194/109     Pulse Readings from Last 3 Encounters:   12/14/20 63   11/26/15 74   11/05/14 65     Wt Readings from Last 3 Encounters:   12/12/20 168 lb (76.2 kg)   12/12/20 168 lb (76.2 kg)   11/26/15 172 lb (78 kg)       General:  alert, cooperative, no distress, appears stated age  Neck:  nontender, no JVD  Lungs:  clear to auscultation bilaterally  Heart:  regular rate and rhythm, S1, S2 normal, no murmur, click, rub or gallop  Abdomen:  abdomen is soft without significant tenderness, masses, organomegaly or guarding  Extremities:  extremities normal, atraumatic, no cyanosis or edema  Skin: Warm and dry.  no hyperpigmentation, vitiligo, or suspicious lesions  Neuro: alert, oriented x3, affect appropriate, moves all extremities well  Psych: non focal     Data Review:     Recent Labs     12/13/20  0240 12/12/20  0104   WBC 6.9 11.0   HGB 13.2 14.1   HCT 38.3 40.8    157     Recent Labs     12/13/20  0240 12/12/20  0104    141   K 3.3* 3.5    102   CO2 30 34*   GLU 97 122*   BUN 16 19*   CREA 0.90 1.18   CA 9.1 9.1   MG 1.9  --    ALB 3.2* 3.7   ALT 19 20       Results for orders placed or performed during the hospital encounter of 12/12/20   EKG, 12 LEAD, INITIAL   Result Value Ref Range    Ventricular Rate 59 BPM    Atrial Rate 59 BPM    P-R Interval 176 ms    QRS Duration 76 ms    Q-T Interval 442 ms    QTC Calculation (Bezet) 437 ms    Calculated P Axis 36 degrees    Calculated R Axis -2 degrees    Calculated T Axis 56 degrees    Diagnosis       Sinus bradycardia  Possible Left atrial enlargement  Minimal voltage criteria for LVH, may be normal variant ( R in aVL )  Borderline ECG  No previous ECGs available  Confirmed by Marco Antonio Child MD, Floridalma Baez ([id number]) on 12/12/2020 2:21:55 PM         All Cardiac Markers in the last 24 hours:  No results found for: CPK, CK, CKMMB, CKMB, RCK3, CKMBT, CKNDX, CKND1, GRETCHEN, TROPT, TROIQ, CANDIDA, TROPT, TNIPOC, BNP, BNPP    Last Lipid:    Lab Results   Component Value Date/Time    Cholesterol, total 232 (H) 12/12/2020 07:45 AM    HDL Cholesterol 86 (H) 12/12/2020 07:45 AM    LDL, calculated 135 (H) 12/12/2020 07:45 AM    Triglyceride 55 12/12/2020 07:45 AM    CHOL/HDL Ratio 2.7 12/12/2020 07:45 AM       Signed By: Haley Bill PA-C     December 14, 2020

## 2020-12-14 NOTE — PROGRESS NOTES
Patient awake. This nurse at bedside obtaining VS. /110; asymptomatic. Call zehra w/in reach. Kaylee Berumen RN (Primary nurse) informed.

## 2020-12-14 NOTE — PROGRESS NOTES
Problem: Discharge Planning  Goal: *Discharge to safe environment  Outcome: Progressing Towards Goal     Problem: Patient Education: Go to Patient Education Activity  Goal: Patient/Family Education  Outcome: Progressing Towards Goal     Problem: Patient Education: Go to Patient Education Activity  Goal: Patient/Family Education  Outcome: Progressing Towards Goal     Problem: Falls - Risk of  Goal: *Absence of Falls  Description: Document Magnus Dominguez Fall Risk and appropriate interventions in the flowsheet.   Outcome: Progressing Towards Goal  Note: Fall Risk Interventions:  Mobility Interventions: Patient to call before getting OOB         Medication Interventions: Bed/chair exit alarm, Patient to call before getting OOB    Elimination Interventions: Call light in reach              Problem: Patient Education: Go to Patient Education Activity  Goal: Patient/Family Education  Outcome: Progressing Towards Goal     Problem: Patient Education: Go to Patient Education Activity  Goal: Patient/Family Education  Outcome: Progressing Towards Goal

## 2020-12-14 NOTE — PROGRESS NOTES
Internal Medicine Progress Note        NAME: Alvarado Hilliard   :  1955  MRM:  [id number]    Date/Time: 2020        ASSESSMENT/PLAN:    Principal Problem:    Acute CVA (cerebrovascular accident) ([facility] [address] (2020)    Active Problems:    TIA (transient ischemic attack) (2020)      Hypertensive crisis (2020)      Imbalance (2020)      Numbness and tingling in right hand (2020)      HLD (hyperlipidemia) (2020)      Lacunar infarction ([address] (2020)      Overview: Old Bilateral Thalamic Lacunar Infarctions. Intracranial bleeding ([facility] [address] (2020)      Chronic cerebrovascular accident (CVA) (2020)      # Acute CVA. Admited to telemetry bed per stroke admission order set protocol. PT/OT/SLP/CM Consult. ASA. Statin . FLP. A1c 5.4. . CT /CTA head and Neck ,  MRI brain reports noted and DW the teleneurologist by myself. -   TTE with bubble test. Telemetry and EKG prn . Control blood pressure per stroke protocol. Tele  Neurology consult. Need 30 days event monitoring after discharge for dysrhythmia , cardiology consulted. I DW the case with Dr Kaylen Ruelas, from [facility] tele neuro on 20  , discussed imaging findings ,  recommend : repeat CT head , continue ASA if no bleed, gradualy control BP,  Work up as OP for possible amyloid angiopathy. Check PT/INR coagulation. TSH WNL. # HTN urgency on presentation . Control BP as above. started  on chlorthalidone/losartan    # non compliance. Counseled at length to comply with medical regimen and strict follow up as OP after discharge until stabilization. # Body mass index is 28.84 kg/m².      3 PT/ CM on DC plan   - DVT protocol SCD     IP CONSULT TO HOSPITALIST  IP CONSULT TO CARDIOLOGY    Lab Review:     Recent Labs     20  0240 20  0104   WBC 6.9 11.0   HGB 13.2 14.1   HCT 38.3 40.8    157     Recent Labs     20  0240 20  0104    141   K 3.3* 3.5    102   CO2 30 34*   GLU 97 122*   BUN 16 19*   CREA 0.90 1.18   CA 9.1 9.1   MG 1.9  --    ALB 3.2* 3.7   TBILI 0.8 0.6   ALT 19 20     No results found for: GLUCPOC  No results for input(s): PH, PCO2, PO2, HCO3, FIO2 in the last 72 hours. No results for input(s): INR, INREXT in the last 72 hours. Lab Results   Component Value Date/Time    Specimen Description: CERVIX/VAGINA 08/23/2012 12:23 AM    Specimen Description: URINE 08/22/2012 10:07 PM     Lab Results   Component Value Date/Time    Culture result: 60,000 11/05/2014 10:50 AM    Culture result: COLONIES/mL 11/05/2014 10:50 AM    Culture result:  11/05/2014 10:50 AM     MIXED GRAM POSITIVE JESSICA, PROBABLE SKIN/GENITAL CONTAMINATION. All Cardiac Markers in the last 24 hours: No results found for: CPK, CK, CKMMB, CKMB, RCK3, CKMBT, CKNDX, CKND1, GRETCHEN, TROPT, TROIQ, CANDIDA, TROPT, TNIPOC, BNP, BNPP        Intervals noted   Pt s/e @ bedside     Subjective:     Chief Complaint:      Numbness and tingling almost gone in her R UL  Stability on R leg when stand and walk getting better, improving. Deny other pains and complains. Admit non compliance. ROS:  No CP/SOB/N/V/D/Pains/Bleeding/ acute joint swelling/rash/itching/fever/chills. Tolerating Diet                Objective:     Vitals:  Last 24hrs VS reviewed since prior progress note.  Most recent are:    Visit Vitals  BP (!) 158/94 (BP 1 Location: Left arm, BP Patient Position: At rest)   Pulse 68   Temp 98.4 °F (36.9 °C)   Resp 18   Ht 5' 4\" (1.626 m)   Wt 76.2 kg (168 lb)   SpO2 96%   BMI 28.84 kg/m²     SpO2 Readings from Last 6 Encounters:   12/14/20 96%   11/26/15 98%   11/05/14 100%            Intake/Output Summary (Last 24 hours) at 12/14/2020 1010  Last data filed at 12/14/2020 0959  Gross per 24 hour   Intake 580 ml   Output 1350 ml   Net -770 ml          Physical Exam:   Gen: Well-developed, well-nourished, in no acute distress  HEENT: Head atraumatic, normocephalic ,    hearing intact to voice, moist mucous membranes  Neck:  No apparent JVD, Supple,    Resp: No accessory muscle use, Bilateral BS present,clear breath sounds without wheezes rales or rhonchi  Card:    normal S1, S2 without Gallop . No Significant lower leg peripheral edema. Abd:  Soft, non-tender, non-distended, bowel sounds are present . Musc: No cyanosis or clubbing  Skin: Warm and dry .  No rashes or ulcers, skin turgor is good   Neuro:    no apparant  facial asymmetry , no clear area of focal motor weakness, follows commands appropriately    alert     Medications Reviewed: (see below)    Lab Data Reviewed: (see below)    ______________________________________________________________________    Medications:     Current Facility-Administered Medications   Medication Dose Route Frequency    nitroglycerin (NITROBID) 2 % ointment 1 Inch  1 Inch Topical Q6H PRN    loratadine (CLARITIN) tablet 10 mg  10 mg Oral DAILY    oxyCODONE-acetaminophen (PERCOCET) 5-325 mg per tablet 1 Tab  1 Tab Oral Q4H PRN    aspirin chewable tablet 81 mg  81 mg Oral DAILY    atorvastatin (LIPITOR) tablet 80 mg  80 mg Oral QHS    docusate sodium (COLACE) capsule 100 mg  100 mg Oral BID PRN    melatonin tablet 12 mg  12 mg Oral QHS PRN    albuterol-ipratropium (DUO-NEB) 2.5 MG-0.5 MG/3 ML  3 mL Nebulization Q6H PRN    pantoprazole (PROTONIX) injection 40 mg  40 mg IntraVENous DAILY PRN    ondansetron (ZOFRAN) injection 4 mg  4 mg IntraVENous Q4H PRN    acetaminophen (TYLENOL) tablet 650 mg  650 mg Oral Q6H PRN    hydrOXYzine HCL (ATARAX) tablet 25 mg  25 mg Oral TID PRN    chlorthalidone (HYGROTON) tablet 25 mg  25 mg Oral DAILY    losartan (COZAAR) tablet 50 mg  50 mg Oral DAILY    labetaloL (NORMODYNE;TRANDATE) injection 5 mg  5 mg IntraVENous Q10MIN PRN          Total time spent with patient: 35 minutes                  Care Plan discussed with: Patient, Nursing Staff, Consultant/Specialist and >50% of time spent in counseling and coordination of care    Discussed:  Care Plan  . This document in whole or part of it has been produced using voice recognition software. Unrecognized errors in transcription may be present.     Attending Physician: Robina Dodd MD

## 2020-12-14 NOTE — PROGRESS NOTES
Bedside and Verbal shift change report given to Stef Damian (oncoming nurse) by *Sister Marlene*RN * (offgoing nurse). Report included the following information SBAR, Kardex, Intake/Output, MAR, Recent Results, Cardiac Rhythm NSR and Quality Measures. NIH completed at change of shift patient with mild  numbness and tingling  r . right hand . Able to ambulate with walker to bathroom. Education provided on meds   Percocet for c/o HA   effective  BP still elevated  rec'd AM meds   Off unit for CT Scan of head  Returned without complaints   VS elevated 172/110  Recheck bp  154/77   Patient assessed without distress. C/O slight tingling in right hand   No further complaints   Bedside and Verbal shift change report given to [facility] [address] (oncoming nurse) by Tung Shukla** (offgoing nurse). Report included the following information SBAR, Kardex, Intake/Output, MAR, Cardiac Rhythm SR and Quality Measures.

## 2020-12-14 NOTE — PROGRESS NOTES
1300: Per nursing, pt PADMINI for procedure. OT to follow up.     Callum Mathew Sagar 86 OTR/L  [phone]

## 2020-12-14 NOTE — PROGRESS NOTES
Problem: Mobility Impaired (Adult and Pediatric)  Goal: *Acute Goals and Plan of Care (Insert Text)  Description: Physical Therapy Goals  Initiated 12/12/2020 and to be accomplished within 7 day(s)  1. Patient will move from supine to sit and sit to supine , scoot up and down, and roll side to side in bed with modified independence. 2.  Patient will transfer from bed to chair and chair to bed with modified independence using the least restrictive device. 3.  Patient will perform sit to stand with modified independence. 4.  Patient will ambulate with modified independence for >/=150 feet with the least restrictive device. 5.  Patient will ascend/descend 3 stairs with handrail(s) with modified independence. Outcome: Progressing Towards Goal   PHYSICAL THERAPY TREATMENT    Patient: Tatyana Pruitt (89 y.o. female)  Date: 12/14/2020  Diagnosis: Hypertensive crisis [I16.9]  TIA (transient ischemic attack) [G45.9]  Numbness and tingling in right hand [R20.0, R20.2]  Imbalance [R26.89]  Acute CVA (cerebrovascular accident) ([address] [I63.9]   Acute CVA (cerebrovascular accident) ([facility] [address]  Precautions: Fall  PLOF: Independent  ASSESSMENT:  Seated EOB upon entry. Intact seated balance. Supervision for sit to stand. Loss of balance to right with initial standing; able to self correct. Amb 50ft with no AD and supervision. Short shuffled steps. Slow gait speed. Unable to increase pace with cues; perseverates on going \"too fast\". Decreased insight into deficits. Amb 4 trials of 50ft with supervision each trial. Trialed ww with further decrease in gait speed and shuffled pace. May trial cane next session. Education to encourage insight to safety and self care at home. Standing balance with supervision; 8 minutes. Returned to seated EOB. Education provided on bed mobility, transfers, ADLs, balance, amb, safety, exercise, role of PT, plan of care, cognition, skin integrity, vitals as indicated.  Educated on need for RN assistance with mobility; verbalized understanding. Call bell in reach. Progression toward goals:   []      Improving appropriately and progressing toward goals  [x]      Improving slowly and progressing toward goals  []      Not making progress toward goals and plan of care will be adjusted     PLAN:  Patient continues to benefit from skilled intervention to address the above impairments. Continue treatment per established plan of care. Discharge Recommendations:  Inpatient Rehab   Further Equipment Recommendations for Discharge:  rolling walker     SUBJECTIVE:   Patient stated I live with my sister.     OBJECTIVE DATA SUMMARY:   Critical Behavior:  Neurologic State: Alert  Orientation Level: Oriented X4  Cognition: Follows commands     Psychosocial  Patient Behaviors: Cooperative  Functional Mobility:  Transfers:  Sit to Stand: Supervision  Stand to Sit: Supervision  Balance:   Sitting: Intact  Standing: Impaired  Standing - Static: Good  Standing - Dynamic : Fair  Ambulation/Gait Training:  Distance (ft): (50, 50, 50, 50,)   Ambulation - Level of Assistance: Supervision  Neuro Re-Education:  Standing 8 minutes  Seated 8 minutes    Pain:  Pain level pre-treatment: 0/10  Pain level post-treatment: 0/10     Activity Tolerance:   Fair    After treatment:   [] Patient left in no apparent distress sitting up in chair  [x] Patient left in no apparent distress in bed; seated EOB  [x] Call bell left within reach  [x] Nursing notified  [] Caregiver present  [] Bed alarm activated  [] SCDs applied      COMMUNICATION/EDUCATION:   [x]         Role of physical therapy in the acute care setting. [x]         Fall prevention education was provided and the patient/caregiver indicated understanding. [x]         Patient/family have participated as able in working toward goals and plan of care. [x]         Patient/family agree to work toward stated goals and plan of care.   []         Patient understands intent and goals of therapy, but is neutral about his/her participation. []         Patient is unable to participate in stated goals/plan of care: ongoing with therapy staff.       Jose Carlos Hyatt, PT   Time Calculation: 23 mins

## 2020-12-14 NOTE — PROGRESS NOTES
Problem: Self Care Deficits Care Plan (Adult)  Goal: *Acute Goals and Plan of Care (Insert Text)  Description: Occupational Therapy Goals  Initiated 12/12/2020 within 7 day(s). 1.  Patient will perform grooming with modified independence in stance at sink with good balance with < 2 seated rest breaks. 2.  Patient will perform bathing with modified independence. 3.  Patient will perform upper body dressing and lower body dressing with modified independence. 4.  Patient will perform toilet transfers with modified independence using RW with good balance. 5.  Patient will perform all aspects of toileting with modified independence. 6.  Patient will participate in upper extremity therapeutic exercise/activities with modified independence for 8 minutes. 7.  Patient will utilize energy conservation techniques during functional activities with min verbal cues. Prior Level of Function: independent with ADLs and functional mobility w/o AD, driving     OCCUPATIONAL THERAPY TREATMENT    Patient: Payton Hare (89 y.o. female)  Date: 12/14/2020  Diagnosis: Hypertensive crisis [I16.9]  TIA (transient ischemic attack) [G45.9]  Numbness and tingling in right hand [R20.0, R20.2]  Imbalance [R26.89]  Acute CVA (cerebrovascular accident) ([facility] [address] [I63.9]   Acute CVA (cerebrovascular accident) [facility])       Precautions: Fall  Chart, occupational therapy assessment, plan of care, and goals were reviewed. ASSESSMENT:  Nursing/RN cleared pt to participate in OT tx. Patient performed toilet transfer with SBA and no AD, additional time, 2 episodes of LOB towards right with pt able to self correct. Toilet tasks: supv clothing mgmt and toilet hygiene w/ use of grab bar as needed for balance. Grooming tasks in stance at sink with Supv for washing hands and oral hygiene. Patient with SBA for functional mobility from bathroom to sitting on edge of bed, fair dynamic standing balance.  Patient sitting on edge of bed to eat late lunch, able to utilize BUEs for cutting food, opening packaging with Mod I. Patient has no c/o pain, states numbness continued in right hand and coordination appears to be improving. Call bell within reach & pt verbalized understanding and provided return demonstration to utilize for assist e.g. functional transfers in order to prevent falls. Progression toward goals:  []          Improving appropriately and progressing toward goals  [x]          Improving slowly and progressing toward goals  []          Not making progress toward goals and plan of care will be adjusted     PLAN:  Patient continues to benefit from skilled intervention to address the above impairments. Continue treatment per established plan of care. Discharge Recommendations:  Inpatient Rehab  Further Equipment Recommendations for Discharge:  shower chair, grab bars and rolling walker     SUBJECTIVE:   Patient stated I think I'm doing better.     OBJECTIVE DATA SUMMARY:   Cognitive/Behavioral Status:  Neurologic State: Alert  Orientation Level: Oriented X4  Cognition: Follows commands  Safety/Judgement: Awareness of environment, Fall prevention    Functional Mobility and Transfers for ADLs:   Bed Mobility:     Supine to Sit: Supervision  Sit to Supine: Supervision      Transfers:  Sit to Stand: Supervision  Stand to Sit: Supervision       Toilet Transfer : Stand-by assistance     Bathroom Mobility: Stand-by assistance        Balance:  Sitting: Impaired  Standing: Impaired  Standing - Static: Good  Standing - Dynamic : Fair    ADL Intervention:  Grooming  Position Performed: Standing  Washing Hands: Supervision  Brushing Teeth: Supervision    Toileting  Bladder Hygiene: Supervision  Clothing Management: Supervision    Pain:  Pain level pre-treatment: 0/10   Pain level post-treatment: 0/10  Pain Intervention(s): Medication (see MAR);  Rest, Ice, Repositioning   Response to intervention: Nurse notified, See doc flow    Activity Tolerance: fair  Please refer to the flowsheet for vital signs taken during this treatment. After treatment:   []  Patient left in no apparent distress sitting up in chair  [x]  Patient left in no apparent distress in bed  [x]  Call bell left within reach  [x]  Nursing notified  []  Caregiver present  []  Bed alarm activated    COMMUNICATION/EDUCATION:   [x] Role of Occupational Therapy in the acute care setting  [x] Home safety education was provided and the patient/caregiver indicated understanding. [x] Patient/family have participated as able in working towards goals and plan of care. [x] Patient/family agree to work toward stated goals and plan of care. [] Patient understands intent and goals of therapy, but is neutral about his/her participation. [] Patient is unable to participate in goal setting and plan of care.       Thank you for this referral.  General Orion Boone  Time Calculation: 16 mins

## 2020-12-14 NOTE — PROGRESS NOTES
1912- Bedside report given by  Norris Platt RN Pt standing at bedside eating dinner. No changes in NIH. Pt states she is much improved. She still c/o of headache of which she says that it is due to sinus. Reassurance given. Assessment completed plan of care for the shift explaned pt verbalizes understanding. HOB elevated bed low and in locked position, call light within reach. Pt instracted to call for any assistance. Bed alarm activated. 2153- Dr. Ronna Petersen on the unit notified about pt's c/o continoueos headache which she    Says not relieved by tylenol and also the BP which is still high. Wrote order for percocet. 2239- Pt given percocet  1 tab po for  Headache -pain level 7/10. Vitals taken  /101- pt given nitro 1 inch ointment on the left upper chest.Will   2312- NIH completed - no changes  2339- Pt sleeping stated headache much better 4/10. Vitals taken BP  145/83 , HR 69  0030- Pt sleeping  0235- Pt sleeping  0600- Pt sleeping at least BP is coming down and Headache much better. Sensation to right arm better c/o slight tingling. 0745 -Bedside and Verbal shift change report given to Zeb Spurling (oncoming nurse) by Cassandra Aguilera RN (offgoing nurse). Report included the following information SBAR, Kardex, Intake/Output, MAR and Recent Results.

## 2020-12-14 NOTE — PROGRESS NOTES
Discharge/Transition Planning    Per Shanta at [facility].  If pt CT results positive and has OT need , they can accept pt and would need COVID regular test

## 2020-12-14 NOTE — PROGRESS NOTES
conducted an initial consultation and Spiritual Assessment for Serina Tavares, who is a 76 y.o.,female. Patients Primary Language is: [place]. According to the patients EMR Congregation Affiliation is: [facility].     The reason the Patient came to the hospital is:   Patient Active Problem List    Diagnosis Date Noted    Intracranial bleeding ([facility] [address] 12/13/2020    Acute CVA (cerebrovascular accident) ([facility] [address] 12/13/2020    Chronic cerebrovascular accident (CVA) 12/13/2020    TIA (transient ischemic attack) 12/12/2020    Hypertensive crisis 12/12/2020    Imbalance 12/12/2020    Numbness and tingling in right hand 12/12/2020    HLD (hyperlipidemia) 12/12/2020    Lacunar infarction ([facility] [address] 71/10/9975    Lichen sclerosus of female genitalia 11/06/2014    HTN (hypertension) 11/05/2014        The  provided the following Interventions:  Initiated a relationship of care and support with patient in room 2218 today and listend as she talked about the past here at [facility] 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 She still knows a few people who are still here. She also mentioned the sisters who were here then as well who would run a tight hold on the activities here. Patient says she is feeling some better but knows that there are probably some other things that need to be checked out before she is released. Provided information about Spiritual Care Services. Offered prayer and assurance of continued prayers on patients behalf. The following outcomes were achieved:  Patient shared limited information about her medical narrative and spiritual journey/beliefs. Patient processed feeling about current hospitalization. Patient expressed gratitude for pastoral care visit. Assessment:  Patient does not have any Methodist/cultural needs that will affect patients preferences in health care.   There are no further spiritual or Methodist issues which require Spiritual Care Services interventions at this time.       Plan:  Chaplains will continue to follow and will provide pastoral care on an as needed/requested basis    . Emani Soto   Spiritual Care   [phone]

## 2020-12-15 ENCOUNTER — TELEPHONE (OUTPATIENT)
Dept: CARDIOLOGY CLINIC | Age: 65
End: 2020-12-15

## 2020-12-15 ENCOUNTER — HOME HEALTH ADMISSION (OUTPATIENT)
Dept: HOME HEALTH SERVICES | Facility: HOME HEALTH | Age: 65
End: 2020-12-15
Payer: MEDICARE

## 2020-12-15 VITALS
DIASTOLIC BLOOD PRESSURE: 82 MMHG | RESPIRATION RATE: 18 BRPM | TEMPERATURE: 97.9 F | HEIGHT: 64 IN | OXYGEN SATURATION: 97 % | BODY MASS INDEX: 28.68 KG/M2 | HEART RATE: 65 BPM | SYSTOLIC BLOOD PRESSURE: 142 MMHG | WEIGHT: 168 LBS

## 2020-12-15 PROCEDURE — 85027 COMPLETE CBC AUTOMATED: CPT

## 2020-12-15 PROCEDURE — 97116 GAIT TRAINING THERAPY: CPT

## 2020-12-15 PROCEDURE — 36415 COLL VENOUS BLD VENIPUNCTURE: CPT

## 2020-12-15 PROCEDURE — 74011250637 HC RX REV CODE- 250/637: Performed by: INTERNAL MEDICINE

## 2020-12-15 PROCEDURE — 85730 THROMBOPLASTIN TIME PARTIAL: CPT

## 2020-12-15 PROCEDURE — 97110 THERAPEUTIC EXERCISES: CPT

## 2020-12-15 PROCEDURE — 85610 PROTHROMBIN TIME: CPT

## 2020-12-15 PROCEDURE — 84100 ASSAY OF PHOSPHORUS: CPT

## 2020-12-15 PROCEDURE — 74011250637 HC RX REV CODE- 250/637: Performed by: HOSPITALIST

## 2020-12-15 PROCEDURE — 83735 ASSAY OF MAGNESIUM: CPT

## 2020-12-15 PROCEDURE — 80048 BASIC METABOLIC PNL TOTAL CA: CPT

## 2020-12-15 PROCEDURE — 2709999900 HC NON-CHARGEABLE SUPPLY

## 2020-12-15 RX ORDER — GUAIFENESIN 100 MG/5ML
81 LIQUID (ML) ORAL DAILY
Status: SHIPPED | COMMUNITY
Start: 2020-12-16

## 2020-12-15 RX ORDER — ATORVASTATIN CALCIUM 80 MG/1
80 TABLET, FILM COATED ORAL
Qty: 30 TAB | Refills: 0 | Status: SHIPPED | OUTPATIENT
Start: 2020-12-15 | End: 2021-01-14

## 2020-12-15 RX ORDER — AMLODIPINE BESYLATE 5 MG/1
5 TABLET ORAL DAILY
Status: DISCONTINUED | OUTPATIENT
Start: 2020-12-15 | End: 2020-12-16 | Stop reason: HOSPADM

## 2020-12-15 RX ORDER — CHLORTHALIDONE 25 MG/1
25 TABLET ORAL DAILY
Qty: 30 TAB | Refills: 0 | Status: SHIPPED | OUTPATIENT
Start: 2020-12-16 | End: 2021-01-15

## 2020-12-15 RX ORDER — AMLODIPINE BESYLATE 5 MG/1
5 TABLET ORAL DAILY
Qty: 30 TAB | Refills: 0 | Status: SHIPPED | OUTPATIENT
Start: 2020-12-15 | End: 2021-01-14

## 2020-12-15 RX ORDER — LOSARTAN POTASSIUM 50 MG/1
50 TABLET ORAL DAILY
Qty: 30 TAB | Refills: 0 | Status: SHIPPED | OUTPATIENT
Start: 2020-12-16 | End: 2021-01-15

## 2020-12-15 RX ADMIN — CHLORTHALIDONE 25 MG: 25 TABLET ORAL at 08:41

## 2020-12-15 RX ADMIN — OXYCODONE HYDROCHLORIDE AND ACETAMINOPHEN 1 TABLET: 5; 325 TABLET ORAL at 04:27

## 2020-12-15 RX ADMIN — OXYCODONE HYDROCHLORIDE AND ACETAMINOPHEN 1 TABLET: 5; 325 TABLET ORAL at 16:16

## 2020-12-15 RX ADMIN — OXYCODONE HYDROCHLORIDE AND ACETAMINOPHEN 1 TABLET: 5; 325 TABLET ORAL at 08:41

## 2020-12-15 RX ADMIN — AMLODIPINE BESYLATE 5 MG: 5 TABLET ORAL at 10:09

## 2020-12-15 RX ADMIN — LOSARTAN POTASSIUM 50 MG: 50 TABLET ORAL at 08:41

## 2020-12-15 RX ADMIN — LORATADINE 10 MG: 10 TABLET ORAL at 08:41

## 2020-12-15 RX ADMIN — ASPIRIN 81 MG CHEWABLE TABLET 81 MG: 81 TABLET CHEWABLE at 08:41

## 2020-12-15 RX ADMIN — NITROGLYCERIN 1 INCH: 20 OINTMENT TOPICAL at 04:27

## 2020-12-15 NOTE — PROGRESS NOTES
[facility]       December 15, 2020       RE: Alexandra Venegas      To Whom It May Concern,    This is to certify that Alexandra Venegas was hospitalized at [facility]   from 12/12/2020 to 12/15/2020 and may may return to work on 12/20/2020 . Please feel free to contact my office if you have any questions or concerns. Thank you for your assistance in this matter. Sincerely,  Santana Stevens MD  [facility]   Office [phone].

## 2020-12-15 NOTE — PROGRESS NOTES
Problem: Self Care Deficits Care Plan (Adult)  Goal: *Acute Goals and Plan of Care (Insert Text)  Description: Occupational Therapy Goals  Initiated 12/12/2020 within 7 day(s). 1.  Patient will perform grooming with modified independence in stance at sink with good balance with < 2 seated rest breaks. 2.  Patient will perform bathing with modified independence. 3.  Patient will perform upper body dressing and lower body dressing with modified independence. 4.  Patient will perform toilet transfers with modified independence using RW with good balance. 5.  Patient will perform all aspects of toileting with modified independence. 6.  Patient will participate in upper extremity therapeutic exercise/activities with modified independence for 8 minutes. 7.  Patient will utilize energy conservation techniques during functional activities with min verbal cues. Prior Level of Function: independent with ADLs and functional mobility w/o AD, driving     OCCUPATIONAL THERAPY TREATMENT    Patient: Serina Tavares (1 y.o. female)  Date: 12/15/2020  Diagnosis: Hypertensive crisis [I16.9]  TIA (transient ischemic attack) [G45.9]  Numbness and tingling in right hand [R20.0, R20.2]  Imbalance [R26.89]  Acute CVA (cerebrovascular accident) ([facility] [address] [I63.9]   Acute CVA (cerebrovascular accident) [facility])       Precautions: Fall  Chart, occupational therapy assessment, plan of care, and goals were reviewed. ASSESSMENT:  Nursing/RN cleared pt to participate in OT tx. Patient participated in education and issued medium red resistive  theraputty and HEP/visual aide. Following education with demonstration, pt performed return demonstration with good accuracy. Patient performed Theraputty exercises to increase affected R hand/digits strength, coordination and dexterity. Patient verbalized understanding to perform 1x/ daily, 5 reps each or as tolerated and review with OT [facility] services to ensure accuracy with technique.  Patient also issued foam resistive ball, provided demonstration and instruction for right hand & digit exercises to increase intrinsic muscle strength, coordination and dexterity, pt provided good return demonstration. Patient issued visual aide with instruction for energy conservation techniques to incorporate into ADL & IADL routines, pt instructed to continue to review in order to incorporate into upcoming OT tx sessions in order to increase accuracy with carryover. Patient issued built up pen to improve right hand grasp and provide tactile feedback with handwriting using dominant right hand, pt provided good return demonstration with use. Patient verbalized understanding to utilize call bell to request assist e.g. functional transfers in order to prevent falls. Progression toward goals:  [x]          Improving appropriately and progressing toward goals  []          Improving slowly and progressing toward goals  []          Not making progress toward goals and plan of care will be adjusted     PLAN:  Patient continues to benefit from skilled intervention to address the above impairments. Continue treatment per established plan of care. Discharge Recommendations:  Home Health  Further Equipment Recommendations for Discharge:  straight cane, grab bars and shower chair     SUBJECTIVE:   Patient stated This feels good.  re: right hand and digit exercises using Theraputty and resistive foam ball    OBJECTIVE DATA SUMMARY:   Cognitive/Behavioral Status:  Neurologic State: Alert  Orientation Level: Oriented X4  Cognition: Follows commands  Safety/Judgement: Awareness of environment, Fall prevention    UE Therapeutic Exercises: Following education with demonstration, pt performed return demonstration with good accuracy. Patient performed Theraputty exercises to increase affected R hand/digits strength, coordination and dexterity.  Patient verbalized understanding to perform 1x/ daily, 5 reps each or as tolerated and review with OT [facility] services to ensure accuracy with technique. Patient also issued foam resistive ball, provided demonstration and instruction for right hand & digit exercises to increase intrinsic muscle strength, coordination and dexterity, pt provided good return demonstration. Pain:  Pain level pre-treatment: 0/10   Pain level post-treatment: 0/10  Pain Intervention(s): Medication (see MAR); Rest, Ice, Repositioning   Response to intervention: Nurse notified, See doc flow    Activity Tolerance:    good  Please refer to the flowsheet for vital signs taken during this treatment. After treatment:   []  Patient left in no apparent distress sitting up in chair  [x]  Patient left in no apparent distress in bed  [x]  Call bell left within reach  [x]  Nursing notified  []  Caregiver present  []  Bed alarm activated    COMMUNICATION/EDUCATION:   [x] Role of Occupational Therapy in the acute care setting  [x] Home safety education was provided and the patient/caregiver indicated understanding. [x] Patient/family have participated as able in working towards goals and plan of care. [x] Patient/family agree to work toward stated goals and plan of care. [] Patient understands intent and goals of therapy, but is neutral about his/her participation. [] Patient is unable to participate in goal setting and plan of care.       Thank you for this referral.  Wyatt Boone  Time Calculation: 30 mins

## 2020-12-15 NOTE — PROGRESS NOTES
Problem: Discharge Planning  Goal: *Discharge to safe environment  Outcome: Resolved/Met     Home with home health      Noted that pt has been declined by acute rehab secondary to not requiring hands on assistance. Met with pt, made her aware of above. Informed her of the options: 1) SNF or home with home health. She chose home with home health. [facility] & [address] Provider list has been given to the patient and/or patient representative. Patient and/or patient representative has signed the [place] of Choice selecting [facility] as their preference agency and a copy given. Both Home Health Provider list and Freedom of Choice have been placed on the chart. Referral entered in epic & called to [facility]. Patient and/or next of kin has been given the [place] Important Message From Medicare About Your Rights\" letter and all questions were answered. Available as needed. Elba MooreRN,ext [phone]. Care Management Interventions  PCP Verified by CM: Yes(PCP visit about 3 months ago)  Palliative Care Criteria Met (RRAT>21 & CHF Dx)?: No  Mode of Transport at Discharge: Other (see comment)(family/friend)  Transition of Care Consult (CM Consult): [address]: Yes  Discharge Durable Medical Equipment: No  Physical Therapy Consult: Yes  Occupational Therapy Consult: Yes  Speech Therapy Consult: Yes  Current Support Network: Other(lives with sister)  Confirm Follow Up Transport: Self  The Plan for Transition of Care is Related to the Following Treatment Goals : skilled nursing.  PT/OT evals  The Patient and/or Patient Representative was Provided with a Choice of Provider and Agrees with the Discharge Plan?: Yes  Name of the Patient Representative Who was Provided with a Choice of Provider and Agrees with the Discharge Plan: Bearl Kocher, patient  Freedom of Choice List was Provided with Basic Dialogue that Supports the Patient's Individualized Plan of Care/Goals, Treatment Preferences and Shares the Quality Data Associated with the Providers?: No  [place] Resource Information Provided?: No  Discharge Location  Discharge Placement: Home with home health(BS)

## 2020-12-15 NOTE — TELEPHONE ENCOUNTER
Contacted pt at [facility] number. Two patient Identifiers confirmed. Advised pt per KENISHA Morrow instructions. Pt verbalized understanding and scheduled for 01/29/2021 at 1300.

## 2020-12-15 NOTE — ROUTINE PROCESS
Noted blood pressure of 164/102. Nitro paste administered as ordered. Patient complaint of headache and jaw pain. States that jaw pain feels more like sinus pain. States pain is 10 of 10. Percocet 5/325 mg administered as ordered. Blood pressure rechecked after 20 minutes. 146/92. No adverse reactions noted.     Barney Fairchild RN, BSN

## 2020-12-15 NOTE — PROGRESS NOTES
Problem: Mobility Impaired (Adult and Pediatric)  Goal: *Acute Goals and Plan of Care (Insert Text)  Description: Physical Therapy Goals  Initiated 12/12/2020 and to be accomplished within 7 day(s)  1. Patient will move from supine to sit and sit to supine , scoot up and down, and roll side to side in bed with modified independence. 2.  Patient will transfer from bed to chair and chair to bed with modified independence using the least restrictive device. 3.  Patient will perform sit to stand with modified independence. 4.  Patient will ambulate with modified independence for >/=150 feet with the least restrictive device. 5.  Patient will ascend/descend 3 stairs with handrail(s) with modified independence. Outcome: Progressing Towards Goal   PHYSICAL THERAPY TREATMENT    Patient: Waqas Reese (45 y.o. female)  Date: 12/15/2020  Diagnosis: Hypertensive crisis [I16.9]  TIA (transient ischemic attack) [G45.9]  Numbness and tingling in right hand [R20.0, R20.2]  Imbalance [R26.89]  Acute CVA (cerebrovascular accident) ([address] [I63.9]   Acute CVA (cerebrovascular accident) ([address]  Precautions: Fall  PLOF: Independent  ASSESSMENT:  Seated EOB upon entry. Mod I for sit to stand. Agreeable to trial standard cane for amb. Educated on safe negotiation for amb with ww. Amb 300ft with standard cane and supervision. Cues for gait speed and step length. Decreased safety awareness and attention to task. Frequent standing breaks to talk to staff; every 30-40ft. Returned to room and seated on EOB. Educated on purchase of straight cane for home use from local pharmacy; verbalized understanding. Seated with OT at end of session. Educated on need for RN assistance with mobility; verbalized understanding. Call bell in reach.      Progression toward goals:   [x]      Improving appropriately and progressing toward goals  []      Improving slowly and progressing toward goals  []      Not making progress toward goals and plan of care will be adjusted     PLAN:  Patient continues to benefit from skilled intervention to address the above impairments. Continue treatment per established plan of care. Discharge Recommendations:  Home Health  Further Equipment Recommendations for Discharge:  straight cane     SUBJECTIVE:   Patient stated You want to walk in the rolno?     OBJECTIVE DATA SUMMARY:   Critical Behavior:  Neurologic State: Alert  Orientation Level: Oriented X4  Cognition: Follows commands     Psychosocial  Patient Behaviors: Cooperative  Functional Mobility:  Transfers:  Sit to Stand: Modified independent  Stand to Sit: Modified independent  Balance:   Sitting: Intact  Standing: Impaired  Standing - Static: Good  Standing - Dynamic : Fair  Ambulation/Gait Training:  Distance (ft): 300 Feet (ft)   Assistive Device: Cane, straight  Ambulation - Level of Assistance: Supervision  Neuro Re-Education:  Standing balance; 5 minutes    Pain:  Pain level pre-treatment: 0/10  Pain level post-treatment: 0/10     Activity Tolerance:   Good    After treatment:   [] Patient left in no apparent distress sitting up in chair  [x] Patient left in no apparent distress in bed; seated EOB  [x] Call bell left within reach  [x] Nursing notified  [] Caregiver present  [] Bed alarm activated  [] SCDs applied      COMMUNICATION/EDUCATION:   [x]         Role of physical therapy in the acute care setting. [x]         Fall prevention education was provided and the patient/caregiver indicated understanding. [x]         Patient/family have participated as able in working toward goals and plan of care. [x]         Patient/family agree to work toward stated goals and plan of care. []         Patient understands intent and goals of therapy, but is neutral about his/her participation. []         Patient is unable to participate in stated goals/plan of care: ongoing with therapy staff.       Karen Reeves PT   Time Calculation: 16 mins

## 2020-12-15 NOTE — ROUTINE PROCESS
Bedside and Verbal shift change report given to Nadege Harrison RN, BSN (oncoming nurse) by Paddy Browne RN (offgoing nurse). Report included the following information SBAR, Kardex, ED Summary, Intake/Output, MAR, Recent Results and Cardiac Rhythm NSR.      Nadege Harrison, ELENO, BSN

## 2020-12-15 NOTE — ROUTINE PROCESS
0730  Bedside, Verbal and Written shift change report received from Ward Macias. Report included the following information SBAR, Kardex, Intake/Output, MAR and Recent Results. -- AM medications administered, pt tolerated with ease, will continue to monitor. 6982 Patient ambulating in the hallway with PT.    1130 Patient currently resting in bed, alert and oriented to all spheres, denies pain or shortness of breath, call bell in reach, 5 Ps and hourly rounding completed, bed locked in low position. Discharge order noted. BP still elevated. Patient advised. -- Shift reassessment, pt condition unchanged, will continue to monitor. 1611 /95. Patient complaining of headache - pain med given. 1736 BP is 142/82 - informed Dr Johanna Santiago - (DISCHARGE WHEN SBP < 140 AND DBP < 80). 1810 \"Patient can go. Her BP is good. \" - per Dr Johanna Santiago. 1815 Patient for discharge. Will discuss home medications and prescription handed. Encouraged to follow-up with PCP as scheduled.  Patient's ride will be after 8 PM.

## 2020-12-15 NOTE — DISCHARGE INSTRUCTIONS
HOSPITALIST DISCHARGE INSTRUCTIONS  NAME: Elba Rea   :  1955   MRN:  [id number]     Date/Time:  12/15/2020 9:12 AM    ADMIT DATE: 2020     DISCHARGE DATE: 12/15/2020     DISCHARGE DIAGNOSIS:  CVA  HTN ,Uncontrolled  Non compliance with medical management     MEDICATIONS:  {Medication reconciliation information is now added to the patient's AVS automatically when it is printed. There is no need to use this SmartLink in discharge instructions. Highlight this text and delete it to clear this message}     · It is important that you take the medication exactly as they are prescribed. · Keep your medication in the bottles provided by the pharmacist and keep a list of the medication names, dosages, and times to be taken in your wallet. · Do not take other medications without consulting your doctor. Pain Management: per above medications    What to do at Home    Recommended diet:  Cardiac Diet, Low fat, Low cholesterol and  NO ADDED SALTS, LOW SALT DIET    Recommended activity: Activity as tolerated and PT/OT per [address] and life style as discussed  Check your BP at least daily and keep log book toy you PCP for review. Come back if any worsening or new symptoms or high BP as discussed. If you experience any of the following symptoms then please call your primary care physician or return to the emergency room if you cannot get hold of your doctor:  Fever, chills, nausea, vomiting, diarrhea, change in mentation, falling, bleeding, shortness of breath. Any new or worsening symptoms. Or call 911 for emergency. Follow Up:  Dr. Luca Philip MD  you are to call and set up an appointment to see them in 7 days. Information obtained by :  I understand that if any problems occur once I am at home I am to contact my physician. I understand and acknowledge receipt of the instructions indicated above. Physician's or R.N.'s Signature                                                                  Date/Time                                                                                                                                              Patient or Representative Signature                                                          Date/Time        Patient Education        High Blood Pressure: Care Instructions  Overview     It's normal for blood pressure to go up and down throughout the day. But if it stays up, you have high blood pressure. Another name for high blood pressure is hypertension. Despite what a lot of people think, high blood pressure usually doesn't cause headaches or make you feel dizzy or lightheaded. It usually has no symptoms. But it does increase your risk of stroke, heart attack, and other problems. You and your doctor will talk about your risks of these problems based on your blood pressure. Your doctor will give you a goal for your blood pressure. Your goal will be based on your health and your age. Lifestyle changes, such as eating healthy and being active, are always important to help lower blood pressure. You might also take medicine to reach your blood pressure goal.  Follow-up care is a key part of your treatment and safety. Be sure to make and go to all appointments, and call your doctor if you are having problems. It's also a good idea to know your test results and keep a list of the medicines you take. How can you care for yourself at home? Medical treatment  · If you stop taking your medicine, your blood pressure will go back up. You may take one or more types of medicine to lower your blood pressure. Be safe with medicines. Take your medicine exactly as prescribed. Call your doctor if you think you are having a problem with your medicine.   · Talk to your doctor before you start taking aspirin every day. Aspirin can help certain people lower their risk of a heart attack or stroke. But taking aspirin isn't right for everyone, because it can cause serious bleeding. · See your doctor regularly. You may need to see the doctor more often at first or until your blood pressure comes down. · If you are taking blood pressure medicine, talk to your doctor before you take decongestants or anti-inflammatory medicine, such as ibuprofen. Some of these medicines can raise blood pressure. · Learn how to check your blood pressure at home. Lifestyle changes  · Stay at a healthy weight. This is especially important if you put on weight around the waist. Losing even 10 pounds can help you lower your blood pressure. · If your doctor recommends it, get more exercise. Walking is a good choice. Bit by bit, increase the amount you walk every day. Try for at least 30 minutes on most days of the week. You also may want to swim, bike, or do other activities. · Avoid or limit alcohol. Talk to your doctor about whether you can drink any alcohol. · Try to limit how much sodium you eat to less than 2,300 milligrams (mg) a day. Your doctor may ask you to try to eat less than 1,500 mg a day. · Eat plenty of fruits (such as bananas and oranges), vegetables, legumes, whole grains, and low-fat dairy products. · Lower the amount of saturated fat in your diet. Saturated fat is found in animal products such as milk, cheese, and meat. Limiting these foods may help you lose weight and also lower your risk for heart disease. · Do not smoke. Smoking increases your risk for heart attack and stroke. If you need help quitting, talk to your doctor about stop-smoking programs and medicines. These can increase your chances of quitting for good. When should you call for help? Call  911 anytime you think you may need emergency care.  This may mean having symptoms that suggest that your blood pressure is causing a serious heart or blood vessel problem. Your blood pressure may be over 180/120. For example, call 911 if:    · You have symptoms of a heart attack. These may include:  ? Chest pain or pressure, or a strange feeling in the chest.  ? Sweating. ? Shortness of breath. ? Nausea or vomiting. ? Pain, pressure, or a strange feeling in the back, neck, jaw, or upper belly or in one or both shoulders or arms. ? Lightheadedness or sudden weakness. ? A fast or irregular heartbeat.     · You have symptoms of a stroke. These may include:  ? Sudden numbness, tingling, weakness, or loss of movement in your face, arm, or leg, especially on only one side of your body. ? Sudden vision changes. ? Sudden trouble speaking. ? Sudden confusion or trouble understanding simple statements. ? Sudden problems with walking or balance. ? A sudden, severe headache that is different from past headaches.     · You have severe back or belly pain. Do not wait until your blood pressure comes down on its own. Get help right away. Call your doctor now or seek immediate care if:    · Your blood pressure is much higher than normal (such as 180/120 or higher), but you don't have symptoms.     · You think high blood pressure is causing symptoms, such as:  ? Severe headache.  ? Blurry vision. Watch closely for changes in your health, and be sure to contact your doctor if:    · Your blood pressure measures higher than your doctor recommends at least 2 times. That means the top number is higher or the bottom number is higher, or both.     · You think you may be having side effects from your blood pressure medicine. Where can you learn more? Go to http://www.gray.com/  Enter X615676 in the search box to learn more about \"High Blood Pressure: Care Instructions. \"  Current as of: December 16, 2019               Content Version: 12.6  © 2670-4518 [employer].    Care instructions adapted under license by [employer] (which disclaims liability or warranty for this information). If you have questions about a medical condition or this instruction, always ask your healthcare professional. [place] [address] any warranty or liability for your use of this information. Patient Education        Transient Ischemic Attack: Care Instructions  Your Care Instructions     A transient ischemic attack (TIA) is when blood flow to a part of your brain is blocked for a short time. A TIA is like a stroke but usually lasts only a few minutes. A TIA does not cause lasting brain damage. Any vision problems, slurred speech, or other symptoms usually go away in 10 to 20 minutes. But they may last for up to 24 hours. TIAs are often warning signs of a stroke. Some people who have a TIA may have a stroke in the future. A stroke can cause symptoms like those of a TIA. But a stroke causes lasting damage to your brain. You can take steps to help prevent a stroke. One thing you can do is get early treatment. If you have other new symptoms, or if your symptoms do not get better, go back to the emergency room or call your doctor right away. Getting treatment right away may prevent long-term brain damage caused by a stroke. The doctor has checked you carefully, but problems can develop later. If you notice any problems or new symptoms, get medical treatment right away. Follow-up care is a key part of your treatment and safety. Be sure to make and go to all appointments, and call your doctor if you are having problems. It's also a good idea to know your test results and keep a list of the medicines you take. How can you care for yourself at home? Medicines    · Be safe with medicines. Take your medicines exactly as prescribed. Call your doctor if you think you are having a problem with your medicine.     · If you take a blood thinner, such as aspirin, be sure you get instructions about how to take your medicine safely.  Blood thinners can cause serious bleeding problems.     · Call your doctor if you are not able to take your medicines for any reason.     · Do not take any over-the-counter medicines or herbal products without talking to your doctor first.     · If you take birth control pills or hormone therapy, talk to your doctor. Ask if these treatments are right for you. Lifestyle changes    · Do not smoke. If you need help quitting, talk to your doctor about stop-smoking programs and medicines.     · Be active. If your doctor recommends it, get more exercise. Walking is a good choice. Bit by bit, increase the amount you walk every day. Try for at least 30 minutes on most days of the week. You also may want to swim, bike, or do other activities.     · Eat heart-healthy foods. These include fruits, vegetables, high-fiber foods, lean meats, beans, peas, nuts, seeds, and soy products, and foods that are low in sodium, saturated fat, and trans fat.     · Stay at a healthy weight. Lose weight if you need to.     · Limit alcohol to 2 drinks a day for men and 1 drink a day for women. Staying healthy    · Manage other health problems such as diabetes, high blood pressure, and high cholesterol.     · Get the flu vaccine every year. When should you call for help? Call 911 anytime you think you may need emergency care. For example, call if:    · You have new or worse symptoms of a stroke. These may include:  ? Sudden numbness, tingling, weakness, or loss of movement in your face, arm, or leg, especially on only one side of your body. ? Sudden vision changes. ? Sudden trouble speaking. ? Sudden confusion or trouble understanding simple statements. ? Sudden problems with walking or balance. ? A sudden, severe headache that is different from past headaches. Call 911 even if these symptoms go away in a few minutes.     · You feel like you are having another TIA.    Watch closely for changes in your health, and be sure to contact your doctor if you have any problems. Where can you learn more? Go to http://www.[employer].com/  Enter I231 in the search box to learn more about \"Transient Ischemic Attack: Care Instructions. \"  Current as of: March 4, 2020               Content Version: 12.6  © 6269-5564 [employer]. Care instructions adapted under license by [employer] (which disclaims liability or warranty for this information). If you have questions about a medical condition or this instruction, always ask your healthcare professional. [address] any warranty or liability for your use of this information. Patient Education        DASH Diet: Care Instructions  Your Care Instructions     The DASH diet is an eating plan that can help lower your blood pressure. DASH stands for Dietary Approaches to Stop Hypertension. Hypertension is high blood pressure. The DASH diet focuses on eating foods that are high in calcium, potassium, and magnesium. These nutrients can lower blood pressure. The foods that are highest in these nutrients are fruits, vegetables, low-fat dairy products, nuts, seeds, and legumes. But taking calcium, potassium, and magnesium supplements instead of eating foods that are high in those nutrients does not have the same effect. The DASH diet also includes whole grains, fish, and poultry. The DASH diet is one of several lifestyle changes your doctor may recommend to lower your high blood pressure. Your doctor may also want you to decrease the amount of sodium in your diet. Lowering sodium while following the DASH diet can lower blood pressure even further than just the DASH diet alone. Follow-up care is a key part of your treatment and safety. Be sure to make and go to all appointments, and call your doctor if you are having problems. It's also a good idea to know your test results and keep a list of the medicines you take. How can you care for yourself at home?   Following the DASH diet  · Eat 4 to 5 servings of fruit each day. A serving is 1 medium-sized piece of fruit, ½ cup chopped or canned fruit, 1/4 cup dried fruit, or 4 ounces (½ cup) of fruit juice. Choose fruit more often than fruit juice. · Eat 4 to 5 servings of vegetables each day. A serving is 1 cup of lettuce or raw leafy vegetables, ½ cup of chopped or cooked vegetables, or 4 ounces (½ cup) of vegetable juice. Choose vegetables more often than vegetable juice. · Get 2 to 3 servings of low-fat and fat-free dairy each day. A serving is 8 ounces of milk, 1 cup of yogurt, or 1 ½ ounces of cheese. · Eat 6 to 8 servings of grains each day. A serving is 1 slice of bread, 1 ounce of dry cereal, or ½ cup of cooked rice, pasta, or cooked cereal. Try to choose whole-grain products as much as possible. · Limit lean meat, poultry, and fish to 2 servings each day. A serving is 3 ounces, about the size of a deck of cards. · Eat 4 to 5 servings of nuts, seeds, and legumes (cooked dried beans, lentils, and split peas) each week. A serving is 1/3 cup of nuts, 2 tablespoons of seeds, or ½ cup of cooked beans or peas. · Limit fats and oils to 2 to 3 servings each day. A serving is 1 teaspoon of vegetable oil or 2 tablespoons of salad dressing. · Limit sweets and added sugars to 5 servings or less a week. A serving is 1 tablespoon jelly or jam, ½ cup sorbet, or 1 cup of lemonade. · Eat less than 2,300 milligrams (mg) of sodium a day. If you limit your sodium to 1,500 mg a day, you can lower your blood pressure even more. Tips for success  · Start small. Do not try to make dramatic changes to your diet all at once. You might feel that you are missing out on your favorite foods and then be more likely to not follow the plan. Make small changes, and stick with them. Once those changes become habit, add a few more changes. · Try some of the following:  ? Make it a goal to eat a fruit or vegetable at every meal and at snacks.  This will make it easy to get the recommended amount of fruits and vegetables each day. ? Try yogurt topped with fruit and nuts for a snack or healthy dessert. ? Add lettuce, tomato, cucumber, and onion to sandwiches. ? Combine a ready-made pizza crust with low-fat mozzarella cheese and lots of vegetable toppings. Try using tomatoes, squash, spinach, broccoli, carrots, cauliflower, and onions. ? Have a variety of cut-up vegetables with a low-fat dip as an appetizer instead of chips and dip. ? Sprinkle sunflower seeds or chopped almonds over salads. Or try adding chopped walnuts or almonds to cooked vegetables. ? Try some vegetarian meals using beans and peas. Add garbanzo or kidney beans to salads. Make burritos and tacos with mashed [place] beans or black beans. Where can you learn more? Go to http://www.simon.com/  Enter H967 in the search box to learn more about \"DASH Diet: Care Instructions. \"  Current as of: December 16, 2019               Content Version: 12.6  © 3658-7379 [employer]. Care instructions adapted under license by [employer] (which disclaims liability or warranty for this information). If you have questions about a medical condition or this instruction, always ask your healthcare professional. [address] any warranty or liability for your use of this information.

## 2020-12-15 NOTE — ROUTINE PROCESS
Bedside and Verbal shift change report given to Ashley Olivares RN (oncoming nurse) by Young Carreon RN, BSN (offgoing nurse). Report included the following information SBAR, Kardex, ED Summary, Intake/Output, MAR, Recent Results and Cardiac Rhythm NSR.      Young Carreon RN, BSN

## 2020-12-15 NOTE — DISCHARGE SUMMARY
Discharge Summary      [address]    Patient ID:  Bang Washburn, 76 y.o., female  : 1955    Admit Date: 2020  Discharge Date:  12/15/2020  Length of stay: 2 day(s)    PCP:  Winsome Au MD    Chief Complaint   Patient presents with    Numbness     right arm     Vomiting       Discharge Diagnosis:     Hospital Problems  Date Reviewed: 2020          Codes Class Noted POA    Intracranial bleeding ([facility] [address] ICD-10-CM: I62.9  ICD-9-CM: 432.9  2020 Unknown        * (Principal) Acute CVA (cerebrovascular accident) ([facility] [address] ICD-10-CM: I63.9  ICD-9-CM: 434.91  2020 Unknown        Chronic cerebrovascular accident (CVA) ICD-10-CM: I69.30  ICD-9-CM: 434.91  2020 Unknown        TIA (transient ischemic attack) ICD-10-CM: G45.9  ICD-9-CM: 435.9  2020 Yes        Hypertensive crisis ICD-10-CM: I16.9  ICD-9-CM: 401.9  2020 Yes        Imbalance ICD-10-CM: R26.89  ICD-9-CM: 781.2  2020 Yes        Numbness and tingling in right hand ICD-10-CM: R20.0, R20.2  ICD-9-CM: 782.0  2020 Yes        HLD (hyperlipidemia) ICD-10-CM: E78.5  ICD-9-CM: 272.4  2020 Yes        Lacunar infarction [facility]) ICD-10-CM: I63.81  ICD-9-CM: 434.91  2020 Yes    Overview Signed 2020  6:57 AM by Vesta Mcdonough DO     Old Bilateral Thalamic Lacunar Infarctions. Discharge instructions:    Recommended diet:  Cardiac Diet, Low fat, Low cholesterol and  NO ADDED SALTS, LOW SALT DIET    Recommended activity: Activity as tolerated and PT/OT per [address] and life style as discussed  Check your BP at least daily and keep log book toy you PCP for review. Come back if any worsening or new symptoms or high BP as discussed. # Follow-up appointments:      Follow-up Information     Follow up With Specialties Details Why Contact Info    Winsome Au MD Internal Medicine In 1 week  [place] [id number]  [phone]        Schedule an appointment as soon as possible for a visit Neurology clinic of your choice        cardiology clinic for event monitoring, per cardiology team.              HPI on Admission (per admitting physician):  Tej Dixon is a 76 y.o. -American female who presents to [facility] ER with complaint of Right Upper Extremity Numbness and Imbalance. Patient states that at approximately 2200 EST on 12/11/2020, Patient began to experience tingling and numbness in her right hand that radiated up her right arm. Patient also reports experiencing a pull to the right hand side when she was walking and feeling like she was leaning in that direction. Patient also reports some vomiting. Patient denies visual or auditory deficits, aphasia, vertigo, and focal weakness. Patient states that she felt like her Blood Pressure was elevated and can sense this problem, as it has been a long-term issue. Patient reports that she was seen at a [facility] approximately 3 weeks ago and reports that CVA/TIA was ruled out and similar symptoms were attributed to a Dental Abscess of a Molar in her right maxillary jaw. Patient reports that High Blood Pressure runs on her Mother's side of the Family (as do Strokes) and states that she is generally non-compliant with her Anti-Hypertensive medication (Atenolol). Patient denies fevers, chills, diarrhea, cough, chest pain, and sick contacts. For further details and initial management please refer to H/P. Hospital Course:      Patient feel good and eager to go home. Still some little tingling on R hand and some imbalance feeling on R  leg. Stable walking and better with walker. Her BP still above goal. I added Norvasc today , and instructions to DC her when BP better and under control . By Problems :      # Acute CVA. Admited to telemetry bed per stroke admission order set protocol. PT/OT/SLP/CM Consult. ASA. Statin . FLP. A1c 5.4.   CT /CTA head and Neck ,  MRI brain reports noted and DW the teleneurologist by myself. TTE with bubble test. Telemetry and EKG prn . Control blood pressure per stroke protocol. Tele  Neurology consulted. Need 30 days event monitoring after discharge for dysrhythmia , cardiology consulted. I DW the case with Dr Jocy Alcaraz, from [facility] tele neuro on 12/14/20  , discussed imaging findings ,  recommend : repeat CT head , continue ASA if no bleed, gradualy control BP,  Work up as OP for possible amyloid angiopathy. Checked PT/INR coagulation. .TSH WNL.    # HTN urgency on presentation . Non compliance Control BP as above. started  on chlorthalidone/losartan and norvasc added after that.      # Non compliance. Counseled at length to comply with medical regimen and strict follow up as OP after discharge until stabilization.     # Body mass index is 28.84 kg/m². Discharge condition:  improved and stable    Disposition:  Home and with     Procedures:   * No surgery found *      Consultants: Neurology  IP CONSULT TO HOSPITALIST  IP CONSULT TO CARDIOLOGY    Physical Exam on Discharge:  Visit Vitals  BP (!) 162/92   Pulse 63   Temp 97.6 °F (36.4 °C)   Resp 18   Ht 5' 4\" (1.626 m)   Wt 76.2 kg (168 lb)   SpO2 96%   BMI 28.84 kg/m²   Gen:    Well-developed, well-nourished, in no acute distress  HEENT: Head atraumatic, normocephalic ,    hearing intact to voice, moist mucous membranes  Neck:   No apparent JVD, Supple,    Resp:  No accessory muscle use, Bilateral BS present,clear breath sounds without wheezes rales or rhonchi  Card:    normal S1, S2 without Gallop . No Significant lower leg peripheral edema. Abd:  Soft, non-tender, non-distended, bowel sounds are present . Musc:  No cyanosis or clubbing  Skin:   Warm and dry .  No rashes or ulcers, skin turgor is good   Neuro:    no apparant  facial asymmetry , no clear area of focal motor weakness, follows commands appropriately    alert    Hospitalization and discharge instructions d/c in details with : patient , Nursing/CM     Discharge medications :  Current Discharge Medication List      START taking these medications    Details   amLODIPine (NORVASC) 5 mg tablet Take 1 Tab by mouth daily for 30 days. Qty: 30 Tab, Refills: 0      aspirin 81 mg chewable tablet Take 1 Tab by mouth daily. Qty:        atorvastatin (LIPITOR) 80 mg tablet Take 1 Tab by mouth nightly for 30 days. Qty: 30 Tab, Refills: 0      chlorthalidone (HYGROTON) 25 mg tablet Take 1 Tab by mouth daily for 30 days. Qty: 30 Tab, Refills: 0      losartan (COZAAR) 50 mg tablet Take 1 Tab by mouth daily for 30 days. Qty: 30 Tab, Refills: 0         CONTINUE these medications which have NOT CHANGED    Details   polyethylene glycol (MIRALAX) 17 gram/dose powder Take 17 g by mouth two (2) times a day. 1 tablespoon with 8 oz of water daily  Qty: 255 g, Refills: 1      clobetasol (TEMOVATE) 0.05 % ointment Apply  to affected area two (2) times a day. Qty: 30 g, Refills: 2         STOP taking these medications       atenoloL (TENORMIN) 100 mg tablet Comments:   Reason for Stopping:         hydrOXYzine HCL (ATARAX) 25 mg tablet Comments:   Reason for Stopping:         traMADol (ULTRAM) 50 mg tablet Comments:   Reason for Stopping:                   Most Recent Labs:       Recent Labs     12/13/20  0240   WBC 6.9   HGB 13.2   HCT 38.3        Recent Labs     12/13/20  0240      K 3.3*      CO2 30   GLU 97   BUN 16   CREA 0.90   CA 9.1   MG 1.9   ALB 3.2*   TBILI 0.8   ALT 19     No results found for: GLUCPOC  No results for input(s): PH, PCO2, PO2, HCO3, FIO2 in the last 72 hours. No results for input(s): INR, INREXT in the last 72 hours.     Lab Results   Component Value Date/Time    Specimen Description: CERVIX/VAGINA 08/23/2012 12:23 AM    Specimen Description: URINE 08/22/2012 10:07 PM     Lab Results   Component Value Date/Time    Culture result: 60,000 11/05/2014 10:50 AM    Culture result: COLONIES/mL 11/05/2014 10:50 AM    Culture result:  11/05/2014 10:50 AM     MIXED GRAM POSITIVE JESSICA, PROBABLE SKIN/GENITAL CONTAMINATION. All Cardiac Markers in the last 24 hours: No results found for: CPK, CK, CKMMB, CKMB, RCK3, CKMBT, CKNDX, CKND1, GRETCHEN, TROPT, TROIQ, CANDIDA, TROPT, TNIPOC, BNP, BNPP    XR Results:  Results from Hospital Encounter encounter on 12/12/20   XR CHEST PORT    Narrative EXAM: Portable Chest    CLINICAL INDICATION: Hypertensive Crisis; Baseline this visit  -Additional: None    COMPARISON: None    TECHNIQUE: AP portable view at 0248    ______________    FINDINGS:    HEART AND MEDIASTINUM: The heart size is normal.    LUNGS AND AIRWAYS: The lungs are clear. PLEURA: No pleural effusion or pneumothorax. BONES: No acute or aggressive osseous lesions. OTHER: None.    ______________      Impression IMPRESSION:    No active cardiopulmonary disease. CT Results:  Results from [place] encounter on 12/12/20   CT HEAD WO CONT    Narrative EXAM: CT head    INDICATION: Stroke, numbness, vomiting    COMPARISON: MRI brain 12/12/2020; CT scan of the head performed same day    TECHNIQUE: Axial CT imaging of the head was performed without intravenous  contrast. Standard multiplanar coronal and sagittal reformatted images were  obtained and are included in interpretation. One or more dose reduction techniques were used on this CT: automated exposure  control, adjustment of the mAs and/or kVp according to patient size, and  iterative reconstruction techniques. The specific techniques used on this CT  exam have been documented in the patient's electronic medical record. Digital  Imaging and Communications in Medicine (DICOM) format image data are available  to nonaffiliated external healthcare facilities or entities on a secure, media  free, reciprocally searchable basis with patient authorization for at least a  12-month period after this study.    _______________    FINDINGS:    BRAIN AND POSTERIOR FOSSA: Mild cortical and cerebellar volume loss  redemonstrated without change. Stable ventricular size and configuration with  patent basilar cisterns. Focal areas of hypodensities in bilateral thalami,  again noted. No intra-axial hemorrhage. No evidence of mass effect or midline  shift. Gray-white matter differentiation appears within normal limits. Mild  periventricular white matter low-attenuation unchanged. EXTRA-AXIAL SPACES AND MENINGES: There are no abnormal extra-axial fluid  collections. CALVARIUM: Intact. SINUSES: Clear. OTHER: None.    _______________      Impression IMPRESSION:      1. No acute intracranial abnormality. Stable examination. 2. Subcortical and periventricular white matter low-attenuation compatible with  sequela of chronic ischemic microvascular change       MRI Results:  Results from Hospital Encounter encounter on 12/12/20   MRI BRAIN W WO CONT    Narrative EXAM: MRI brain without and with gadolinium    CLINICAL INDICATION/HISTORY: Right arm tingling, off balance    > Additional: None. COMPARISON: None. > Reference Exam: CT head same day    TECHNIQUE: Sagittal FLAIRT1, axial T1, T2, FLAIR, susceptibility weighted, and  diffusion sequences obtained of the brain. Following gadolinium administration,  axial and coronal T1 sequences obtained. Imaging performed on wide bore  Discovery JD725m GEM suite 3T magnet at [facility].     _______________    FINDINGS:    Diffusion:  There are questionable tiny foci of increased diffusion signal with  low ADC map involving dorsal suzette, although these may be artifactual due to  adjacent extensive chronic microbleeds and susceptibility artifact at this site. No other restricted diffusion signal.    Brain parenchyma:  Several chronic lacunar infarcts bilateral thalami left  greater than right and very small chronic infarct posterior left middle  cerebellar peduncle.  Tiny chronic small vessel infarct right central suzette. Tiny  cortical chronic infarct posterior superior right cerebellum. No other cortical  signal abnormality. Mild confluent and multifocal increased T2 and FLAIR signal  periventricular and deep cerebral white matter and central suzette nonspecific, as  well as some involvement splenium corpus callosum. There are extensive low  gradient signal foci primarily involving bilateral thalami and basal ganglia  with also mild involvement bilateral periventricular and deep cerebral white  matter, with extensive foci throughout the suzette and several foci within the  bilateral cerebellum. Additional small foci in the upper medulla. No evidence of  intracranial mass or mass effect. Ventricles and sulci:  Normal.  No significant atrophy given age. Extra-axial:  No extra-axial fluid collection or mass is noted. Contrast:There is no abnormal enhancement. Brain vasculature:  No vascular abnormality is appreciated on this routine brain  MR examination. Craniocervical junction:  Normal.    Skull base, extracranial and calvarium:  Orbits, paranasal sinuses and IACs  unremarkable with minimal increased T2 signal inferior right mastoid  nonspecific. Sella and skull unremarkable.      _______________      Impression IMPRESSION:    1. 2 separate tiny foci of questionable acute/subacute infarcts involving the  dorsal suzette, although this diffusion abnormality could be artifactual due to  artifact as above. 2. No other evidence of acute infarct or other acute intracranial finding. 3. Chronic bilateral thalamic lacunar infarcts, with small chronic infarcts left  middle cerebellar peduncle, suzette, and right superior cerebellum. 4. Extensive chronic microbleeds most pronounced involving central gray matter  and suzette although additional involvement of bilateral cerebrum and cerebellum. FINDINGS most likely related to sequela of hypertension, less likely possibility  includes amyloid angiopathy.     5. Cerebral white matter and pontine and cerebellar white matter signal changes  nonspecific, likely chronic microvascular ischemic disease. Nuclear Medicine Results:  No results found for this or any previous visit. US Results:  No results found for this or any previous visit. IR Results:  No results found for this or any previous visit. VAS/US Results:  No results found for this or any previous visit. Total discharge time 35 minutes of which more than 50 % spent on counseling and coordination of care. Iman Leigh MD  Hospitalist  [facility], [facility]. medical group. Hospitalist Division.   December 15, 2020  9:16 AM

## 2020-12-15 NOTE — TELEPHONE ENCOUNTER
----- Message from Wilda Cade PA-C sent at 12/14/2020  2:22 PM EST -----  Regarding: Event Monitor, Clifm Scale  Can you please order a 30 day event monitor for this patient and have her follow up with Clifm Scale in the office in 2 months.  Thank you

## 2020-12-15 NOTE — PROGRESS NOTES
Problem: Discharge Planning  Goal: *Discharge to safe environment  Outcome: Progressing Towards Goal     Problem: Patient Education: Go to Patient Education Activity  Goal: Patient/Family Education  Outcome: Progressing Towards Goal     Problem: Patient Education: Go to Patient Education Activity  Goal: Patient/Family Education  Outcome: Progressing Towards Goal     Problem: Falls - Risk of  Goal: *Absence of Falls  Description: Document Ivin Nolasco Fall Risk and appropriate interventions in the flowsheet.   Outcome: Progressing Towards Goal  Note: Fall Risk Interventions:  Mobility Interventions: Patient to call before getting OOB, Utilize walker, cane, or other assistive device         Medication Interventions: Patient to call before getting OOB, Teach patient to arise slowly    Elimination Interventions: Call light in reach, Patient to call for help with toileting needs, Stay With Me (per policy), Toilet paper/wipes in reach              Problem: Patient Education: Go to Patient Education Activity  Goal: Patient/Family Education  Outcome: Progressing Towards Goal     Problem: Patient Education: Go to Patient Education Activity  Goal: Patient/Family Education  Outcome: Progressing Towards Goal

## 2020-12-15 NOTE — PROGRESS NOTES
Problem: Falls - Risk of  Goal: *Absence of Falls  Description: Document Mary Aguilar Fall Risk and appropriate interventions in the flowsheet.   Outcome: Progressing Towards Goal  Note: Fall Risk Interventions:  Mobility Interventions: OT consult for ADLs, Patient to call before getting OOB, PT Consult for mobility concerns, PT Consult for assist device competence         Medication Interventions: Patient to call before getting OOB, Teach patient to arise slowly    Elimination Interventions: Call light in reach, Patient to call for help with toileting needs, Toileting schedule/hourly rounds

## 2020-12-15 NOTE — PROGRESS NOTES
Problem: Falls - Risk of  Goal: *Absence of Falls  Description: Document Tiara Rizo Fall Risk and appropriate interventions in the flowsheet.   Outcome: Progressing Towards Goal  Note: Fall Risk Interventions:  Mobility Interventions: OT consult for ADLs, Patient to call before getting OOB, PT Consult for mobility concerns, PT Consult for assist device competence         Medication Interventions: Patient to call before getting OOB, Teach patient to arise slowly    Elimination Interventions: Call light in reach, Patient to call for help with toileting needs, Toileting schedule/hourly rounds

## 2020-12-16 ENCOUNTER — HOME CARE VISIT (OUTPATIENT)
Dept: SCHEDULING | Facility: HOME HEALTH | Age: 65
End: 2020-12-16
Payer: MEDICARE

## 2020-12-16 VITALS
DIASTOLIC BLOOD PRESSURE: 134 MMHG | OXYGEN SATURATION: 96 % | SYSTOLIC BLOOD PRESSURE: 209 MMHG | HEART RATE: 82 BPM | TEMPERATURE: 98.4 F

## 2020-12-16 LAB
ANION GAP SERPL CALC-SCNC: 6 MMOL/L (ref 3–18)
APTT PPP: 37.5 SEC (ref 23–36.4)
BUN SERPL-MCNC: 18 MG/DL (ref 7–18)
BUN/CREAT SERPL: 18 (ref 12–20)
CALCIUM SERPL-MCNC: 9.5 MG/DL (ref 8.5–10.1)
CHLORIDE SERPL-SCNC: 101 MMOL/L (ref 100–111)
CO2 SERPL-SCNC: 29 MMOL/L (ref 21–32)
CREAT SERPL-MCNC: 1.02 MG/DL (ref 0.6–1.3)
ERYTHROCYTE [DISTWIDTH] IN BLOOD BY AUTOMATED COUNT: 13.7 % (ref 11.6–14.5)
GLUCOSE SERPL-MCNC: 94 MG/DL (ref 74–99)
HCT VFR BLD AUTO: 38.4 % (ref 35–45)
HGB BLD-MCNC: 13.4 G/DL (ref 12–16)
INR PPP: 1.1 (ref 0.8–1.2)
MAGNESIUM SERPL-MCNC: 2.1 MG/DL (ref 1.6–2.6)
MCH RBC QN AUTO: 29.7 PG (ref 24–34)
MCHC RBC AUTO-ENTMCNC: 34.9 G/DL (ref 31–37)
MCV RBC AUTO: 85.1 FL (ref 74–97)
PHOSPHATE SERPL-MCNC: 4.3 MG/DL (ref 2.5–4.9)
PLATELET # BLD AUTO: 140 K/UL (ref 135–420)
PMV BLD AUTO: 12 FL (ref 9.2–11.8)
POTASSIUM SERPL-SCNC: 3.8 MMOL/L (ref 3.5–5.5)
PROTHROMBIN TIME: 13.7 SEC (ref 11.5–15.2)
RBC # BLD AUTO: 4.51 M/UL (ref 4.2–5.3)
SODIUM SERPL-SCNC: 136 MMOL/L (ref 136–145)
WBC # BLD AUTO: 8.8 K/UL (ref 4.6–13.2)

## 2020-12-16 PROCEDURE — 3331090001 HH PPS REVENUE CREDIT

## 2020-12-16 PROCEDURE — 3331090002 HH PPS REVENUE DEBIT

## 2020-12-16 PROCEDURE — 400013 HH SOC

## 2020-12-16 PROCEDURE — G0151 HHCP-SERV OF PT,EA 15 MIN: HCPCS

## 2020-12-16 NOTE — PROGRESS NOTES
-- Bedside, Verbal and Written shift change report given to Terrenec (oncoming nurse) by Hay Christian- ELENO (offgoing nurse). Report included the following information SBAR, Kardex, Intake/Output, MAR and Recent Results. Pt is D/C waiting for transport.

## 2020-12-16 NOTE — PROGRESS NOTES
-- Bedside, Verbal and Written shift change report given to Terrence (oncoming nurse) by Medina Tay RN (offgoing nurse). Report included the following information SBAR, Kardex, Intake/Output, MAR and Recent Results. Pt is D/C waiting for transport. 2025--check on Pt to know when her ride is coming. Pt states\" I can't get a hold of no one to pick me up\" charge nurse Stella Rangel made aware. A voucher for taxi ride faxed.

## 2020-12-16 NOTE — ROUTINE PROCESS
1917 Bedside and Verbal shift change report given to [address] [place] [address] (oncoming nurse) by Leigh Ann Guevara RN (offgoing nurse). Report included the following information SBAR, Kardex, Intake/Output, MAR, Recent Results and Cardiac Rhythm NSR. Patient for discharge - discharge papers given and signed. Waiting for ride to home.

## 2020-12-17 ENCOUNTER — HOME CARE VISIT (OUTPATIENT)
Dept: HOME HEALTH SERVICES | Facility: HOME HEALTH | Age: 65
End: 2020-12-17
Payer: MEDICARE

## 2020-12-17 ENCOUNTER — HOME CARE VISIT (OUTPATIENT)
Dept: SCHEDULING | Facility: HOME HEALTH | Age: 65
End: 2020-12-17
Payer: MEDICARE

## 2020-12-17 VITALS
RESPIRATION RATE: 14 BRPM | SYSTOLIC BLOOD PRESSURE: 150 MMHG | DIASTOLIC BLOOD PRESSURE: 100 MMHG | TEMPERATURE: 97.8 F | HEART RATE: 78 BPM | OXYGEN SATURATION: 98 %

## 2020-12-17 PROCEDURE — G0299 HHS/HOSPICE OF RN EA 15 MIN: HCPCS

## 2020-12-17 PROCEDURE — 3331090001 HH PPS REVENUE CREDIT

## 2020-12-17 PROCEDURE — 3331090002 HH PPS REVENUE DEBIT

## 2020-12-18 ENCOUNTER — HOME CARE VISIT (OUTPATIENT)
Dept: SCHEDULING | Facility: HOME HEALTH | Age: 65
End: 2020-12-18
Payer: MEDICARE

## 2020-12-18 PROCEDURE — 3331090002 HH PPS REVENUE DEBIT

## 2020-12-18 PROCEDURE — G0157 HHC PT ASSISTANT EA 15: HCPCS

## 2020-12-18 PROCEDURE — 3331090001 HH PPS REVENUE CREDIT

## 2020-12-19 ENCOUNTER — HOME CARE VISIT (OUTPATIENT)
Dept: SCHEDULING | Facility: HOME HEALTH | Age: 65
End: 2020-12-19
Payer: MEDICARE

## 2020-12-19 PROCEDURE — 3331090001 HH PPS REVENUE CREDIT

## 2020-12-19 PROCEDURE — 3331090002 HH PPS REVENUE DEBIT

## 2020-12-19 PROCEDURE — G0300 HHS/HOSPICE OF LPN EA 15 MIN: HCPCS

## 2020-12-20 VITALS
OXYGEN SATURATION: 98 % | RESPIRATION RATE: 16 BRPM | SYSTOLIC BLOOD PRESSURE: 138 MMHG | HEART RATE: 84 BPM | TEMPERATURE: 97.4 F | DIASTOLIC BLOOD PRESSURE: 98 MMHG

## 2020-12-20 VITALS
OXYGEN SATURATION: 99 % | SYSTOLIC BLOOD PRESSURE: 132 MMHG | HEART RATE: 74 BPM | TEMPERATURE: 96.9 F | DIASTOLIC BLOOD PRESSURE: 85 MMHG | RESPIRATION RATE: 16 BRPM

## 2020-12-20 PROCEDURE — 3331090002 HH PPS REVENUE DEBIT

## 2020-12-20 PROCEDURE — 3331090001 HH PPS REVENUE CREDIT

## 2020-12-21 ENCOUNTER — HOME CARE VISIT (OUTPATIENT)
Dept: SCHEDULING | Facility: HOME HEALTH | Age: 65
End: 2020-12-21
Payer: MEDICARE

## 2020-12-21 VITALS
HEART RATE: 70 BPM | SYSTOLIC BLOOD PRESSURE: 139 MMHG | TEMPERATURE: 97.8 F | RESPIRATION RATE: 16 BRPM | DIASTOLIC BLOOD PRESSURE: 94 MMHG | OXYGEN SATURATION: 99 %

## 2020-12-21 VITALS
HEART RATE: 82 BPM | TEMPERATURE: 99.1 F | OXYGEN SATURATION: 96 % | DIASTOLIC BLOOD PRESSURE: 86 MMHG | SYSTOLIC BLOOD PRESSURE: 165 MMHG | RESPIRATION RATE: 17 BRPM

## 2020-12-21 PROCEDURE — 3331090001 HH PPS REVENUE CREDIT

## 2020-12-21 PROCEDURE — G0157 HHC PT ASSISTANT EA 15: HCPCS

## 2020-12-21 PROCEDURE — G0152 HHCP-SERV OF OT,EA 15 MIN: HCPCS

## 2020-12-21 PROCEDURE — 3331090002 HH PPS REVENUE DEBIT

## 2020-12-22 ENCOUNTER — HOME CARE VISIT (OUTPATIENT)
Dept: SCHEDULING | Facility: HOME HEALTH | Age: 65
End: 2020-12-22
Payer: MEDICARE

## 2020-12-22 VITALS
OXYGEN SATURATION: 98 % | HEART RATE: 68 BPM | SYSTOLIC BLOOD PRESSURE: 150 MMHG | TEMPERATURE: 97.8 F | DIASTOLIC BLOOD PRESSURE: 100 MMHG | RESPIRATION RATE: 14 BRPM

## 2020-12-22 VITALS — TEMPERATURE: 97.8 F | OXYGEN SATURATION: 98 % | RESPIRATION RATE: 16 BRPM | HEART RATE: 68 BPM

## 2020-12-22 PROCEDURE — 3331090002 HH PPS REVENUE DEBIT

## 2020-12-22 PROCEDURE — 3331090001 HH PPS REVENUE CREDIT

## 2020-12-22 PROCEDURE — G0299 HHS/HOSPICE OF RN EA 15 MIN: HCPCS

## 2020-12-22 PROCEDURE — G0157 HHC PT ASSISTANT EA 15: HCPCS

## 2020-12-23 PROCEDURE — 3331090001 HH PPS REVENUE CREDIT

## 2020-12-23 PROCEDURE — 3331090002 HH PPS REVENUE DEBIT

## 2020-12-24 ENCOUNTER — HOME CARE VISIT (OUTPATIENT)
Dept: HOME HEALTH SERVICES | Facility: HOME HEALTH | Age: 65
End: 2020-12-24
Payer: MEDICARE

## 2020-12-24 ENCOUNTER — HOME CARE VISIT (OUTPATIENT)
Dept: SCHEDULING | Facility: HOME HEALTH | Age: 65
End: 2020-12-24
Payer: MEDICARE

## 2020-12-24 PROCEDURE — 3331090001 HH PPS REVENUE CREDIT

## 2020-12-24 PROCEDURE — 3331090002 HH PPS REVENUE DEBIT

## 2020-12-25 PROCEDURE — 3331090002 HH PPS REVENUE DEBIT

## 2020-12-25 PROCEDURE — 3331090001 HH PPS REVENUE CREDIT

## 2020-12-26 PROCEDURE — 3331090001 HH PPS REVENUE CREDIT

## 2020-12-26 PROCEDURE — 3331090002 HH PPS REVENUE DEBIT

## 2020-12-27 PROCEDURE — 3331090001 HH PPS REVENUE CREDIT

## 2020-12-27 PROCEDURE — 3331090002 HH PPS REVENUE DEBIT

## 2020-12-28 ENCOUNTER — HOME CARE VISIT (OUTPATIENT)
Dept: SCHEDULING | Facility: HOME HEALTH | Age: 65
End: 2020-12-28
Payer: MEDICARE

## 2020-12-28 VITALS
TEMPERATURE: 98 F | RESPIRATION RATE: 16 BRPM | HEART RATE: 75 BPM | OXYGEN SATURATION: 99 % | SYSTOLIC BLOOD PRESSURE: 140 MMHG | DIASTOLIC BLOOD PRESSURE: 88 MMHG

## 2020-12-28 PROCEDURE — 3331090001 HH PPS REVENUE CREDIT

## 2020-12-28 PROCEDURE — 3331090002 HH PPS REVENUE DEBIT

## 2020-12-28 PROCEDURE — G0157 HHC PT ASSISTANT EA 15: HCPCS

## 2020-12-28 NOTE — PROGRESS NOTES
Physician Progress Note      PATIENT:               Radha Monique  CSN #:                  [id number]  :                       1955  ADMIT DATE:       2020 12:48 AM  DISCH DATE:        12/15/2020 10:12 PM  RESPONDING  PROVIDER #:        Shad Anderson MD          QUERY TEXT:    Patient presented with a CVA. Hypertensive crisis and hypertensive urgency are both documented. BP on admission was noted to be 233/155 & 185/127. Patient was treated with Labetalol. After  study, was this patient suspected to have: The medical record reflects the following:  Risk Factors: 77 yo female  with history of hypertension, CVA    Clinical Indicators: BP on admission was noted to be 233/155 >  185/127 > 197/127 > 206/106    Treatment: IV Labetolol, Telemetry monitoring,      Thank your time,  Marky Boyce RN, [facility]  [phone]        Hypertensive Crisis, unspecified: at least 2 consecutive readings of SBP > 180 mmHg or DBP > 110 mmHg  - Hypertensive Urgency: Hypertensive crisis w/o associated organ dysfunction. S/s may or may not be present, but can include severe headache, SOB, epistaxis, severe anxiety. Tx: adjustment of oral antihypertensives; IV meds not usually required. - Hypertensive Emergency: Hypertensive crisis w/ associated organ damage (stroke, encephalopathy, CHERELLE, MI, angina, acute or decompensated CHF, acute pulmonary edema, HELLP, etc.). Requires immediate treatment (usually IV meds) & possible ICU admission. Associated organ dysfunction needs documented.   [email or web address]  Options provided:  -- Hypertensive Crisis  -- Hypertensive Emergency  -- Hypertensive Urgency  -- Other - I will add my own diagnosis  -- Disagree - Not applicable / Not valid  -- Disagree - Clinically unable to determine / Unknown  -- Refer to Clinical Documentation Reviewer    PROVIDER RESPONSE TEXT:    This patient is in hypertensive emergency.     Query created by: Elsa Hernandez on 12/21/2020 8:17 AM      Electronically signed by:  Galen Schwartz MD 12/28/2020 4:39 PM

## 2020-12-29 ENCOUNTER — HOME CARE VISIT (OUTPATIENT)
Dept: SCHEDULING | Facility: HOME HEALTH | Age: 65
End: 2020-12-29
Payer: MEDICARE

## 2020-12-29 VITALS
SYSTOLIC BLOOD PRESSURE: 168 MMHG | TEMPERATURE: 97.6 F | RESPIRATION RATE: 16 BRPM | DIASTOLIC BLOOD PRESSURE: 100 MMHG | OXYGEN SATURATION: 99 % | HEART RATE: 77 BPM

## 2020-12-29 PROCEDURE — 3331090002 HH PPS REVENUE DEBIT

## 2020-12-29 PROCEDURE — 3331090001 HH PPS REVENUE CREDIT

## 2020-12-29 PROCEDURE — G0157 HHC PT ASSISTANT EA 15: HCPCS

## 2020-12-30 ENCOUNTER — HOME CARE VISIT (OUTPATIENT)
Dept: SCHEDULING | Facility: HOME HEALTH | Age: 65
End: 2020-12-30
Payer: MEDICARE

## 2020-12-30 VITALS
HEART RATE: 73 BPM | SYSTOLIC BLOOD PRESSURE: 160 MMHG | RESPIRATION RATE: 16 BRPM | TEMPERATURE: 99.1 F | DIASTOLIC BLOOD PRESSURE: 100 MMHG | OXYGEN SATURATION: 96 %

## 2020-12-30 PROCEDURE — G0157 HHC PT ASSISTANT EA 15: HCPCS

## 2020-12-30 PROCEDURE — 3331090002 HH PPS REVENUE DEBIT

## 2020-12-30 PROCEDURE — 3331090001 HH PPS REVENUE CREDIT

## 2020-12-30 PROCEDURE — G0299 HHS/HOSPICE OF RN EA 15 MIN: HCPCS

## 2020-12-31 PROCEDURE — 3331090001 HH PPS REVENUE CREDIT

## 2020-12-31 PROCEDURE — 3331090002 HH PPS REVENUE DEBIT

## 2021-01-01 ENCOUNTER — HOME CARE VISIT (OUTPATIENT)
Dept: SCHEDULING | Facility: HOME HEALTH | Age: 66
End: 2021-01-01
Payer: MEDICARE

## 2021-01-01 PROCEDURE — 3331090002 HH PPS REVENUE DEBIT

## 2021-01-01 PROCEDURE — 3331090001 HH PPS REVENUE CREDIT

## 2021-01-02 PROCEDURE — 3331090002 HH PPS REVENUE DEBIT

## 2021-01-02 PROCEDURE — 3331090001 HH PPS REVENUE CREDIT

## 2021-01-03 VITALS
OXYGEN SATURATION: 96 % | HEART RATE: 73 BPM | SYSTOLIC BLOOD PRESSURE: 160 MMHG | TEMPERATURE: 99.1 F | DIASTOLIC BLOOD PRESSURE: 100 MMHG

## 2021-01-03 PROCEDURE — 3331090001 HH PPS REVENUE CREDIT

## 2021-01-03 PROCEDURE — 3331090002 HH PPS REVENUE DEBIT

## 2021-01-04 PROCEDURE — 3331090001 HH PPS REVENUE CREDIT

## 2021-01-04 PROCEDURE — 3331090002 HH PPS REVENUE DEBIT

## 2021-01-05 PROCEDURE — 3331090001 HH PPS REVENUE CREDIT

## 2021-01-05 PROCEDURE — 3331090002 HH PPS REVENUE DEBIT

## 2021-01-06 PROCEDURE — 3331090002 HH PPS REVENUE DEBIT

## 2021-01-06 PROCEDURE — 3331090001 HH PPS REVENUE CREDIT

## 2021-01-07 ENCOUNTER — HOME CARE VISIT (OUTPATIENT)
Dept: SCHEDULING | Facility: HOME HEALTH | Age: 66
End: 2021-01-07
Payer: MEDICARE

## 2021-01-07 PROCEDURE — 3331090001 HH PPS REVENUE CREDIT

## 2021-01-07 PROCEDURE — G0151 HHCP-SERV OF PT,EA 15 MIN: HCPCS

## 2021-01-07 PROCEDURE — G0299 HHS/HOSPICE OF RN EA 15 MIN: HCPCS

## 2021-01-07 PROCEDURE — 3331090002 HH PPS REVENUE DEBIT

## 2021-01-08 PROCEDURE — 3331090002 HH PPS REVENUE DEBIT

## 2021-01-08 PROCEDURE — 3331090001 HH PPS REVENUE CREDIT

## 2021-01-09 VITALS
OXYGEN SATURATION: 99 % | HEART RATE: 88 BPM | SYSTOLIC BLOOD PRESSURE: 140 MMHG | RESPIRATION RATE: 14 BRPM | DIASTOLIC BLOOD PRESSURE: 92 MMHG | TEMPERATURE: 97.3 F

## 2021-01-09 VITALS
OXYGEN SATURATION: 96 % | HEART RATE: 75 BPM | SYSTOLIC BLOOD PRESSURE: 141 MMHG | TEMPERATURE: 97.5 F | DIASTOLIC BLOOD PRESSURE: 91 MMHG | RESPIRATION RATE: 16 BRPM

## 2021-01-09 PROCEDURE — 3331090002 HH PPS REVENUE DEBIT

## 2021-01-09 PROCEDURE — 3331090001 HH PPS REVENUE CREDIT

## 2021-01-29 ENCOUNTER — OFFICE VISIT (OUTPATIENT)
Dept: CARDIOLOGY CLINIC | Age: 66
End: 2021-01-29
Payer: MEDICARE

## 2021-01-29 VITALS
BODY MASS INDEX: 28.84 KG/M2 | RESPIRATION RATE: 16 BRPM | HEART RATE: 83 BPM | HEIGHT: 64 IN | TEMPERATURE: 98.7 F | DIASTOLIC BLOOD PRESSURE: 64 MMHG | SYSTOLIC BLOOD PRESSURE: 95 MMHG

## 2021-01-29 DIAGNOSIS — E78.00 PURE HYPERCHOLESTEROLEMIA: ICD-10-CM

## 2021-01-29 DIAGNOSIS — I63.9 ACUTE CVA (CEREBROVASCULAR ACCIDENT) (HCC): Primary | ICD-10-CM

## 2021-01-29 DIAGNOSIS — I10 ESSENTIAL HYPERTENSION WITH GOAL BLOOD PRESSURE LESS THAN 140/90: ICD-10-CM

## 2021-01-29 PROCEDURE — G8428 CUR MEDS NOT DOCUMENT: HCPCS | Performed by: INTERNAL MEDICINE

## 2021-01-29 PROCEDURE — G8752 SYS BP LESS 140: HCPCS | Performed by: INTERNAL MEDICINE

## 2021-01-29 PROCEDURE — G9899 SCRN MAM PERF RSLTS DOC: HCPCS | Performed by: INTERNAL MEDICINE

## 2021-01-29 PROCEDURE — G8536 NO DOC ELDER MAL SCRN: HCPCS | Performed by: INTERNAL MEDICINE

## 2021-01-29 PROCEDURE — 1101F PT FALLS ASSESS-DOCD LE1/YR: CPT | Performed by: INTERNAL MEDICINE

## 2021-01-29 PROCEDURE — G8399 PT W/DXA RESULTS DOCUMENT: HCPCS | Performed by: INTERNAL MEDICINE

## 2021-01-29 PROCEDURE — 1090F PRES/ABSN URINE INCON ASSESS: CPT | Performed by: INTERNAL MEDICINE

## 2021-01-29 PROCEDURE — G8754 DIAS BP LESS 90: HCPCS | Performed by: INTERNAL MEDICINE

## 2021-01-29 PROCEDURE — 99214 OFFICE O/P EST MOD 30 MIN: CPT | Performed by: INTERNAL MEDICINE

## 2021-01-29 PROCEDURE — G8419 CALC BMI OUT NRM PARAM NOF/U: HCPCS | Performed by: INTERNAL MEDICINE

## 2021-01-29 PROCEDURE — G8432 DEP SCR NOT DOC, RNG: HCPCS | Performed by: INTERNAL MEDICINE

## 2021-01-29 PROCEDURE — 3017F COLORECTAL CA SCREEN DOC REV: CPT | Performed by: INTERNAL MEDICINE

## 2021-01-29 NOTE — LETTER
1/29/2021 Patient: Sparkle Aquino YOB: 1955 Date of Visit: 1/29/2021 Cheikh Hale MD 
[id number] [id number] Via Fax: [phone] Dear Cheikh Hale MD, Thank you for referring Ms. Mamie Feliciano to [facility] SPECIALIST AT [address] [place] [address] for evaluation. My notes for this consultation are attached. If you have questions, please do not hesitate to call me. I look forward to following your patient along with you. Sincerely, Ashli Guajardo MD

## 2021-01-29 NOTE — PROGRESS NOTES
[facility]    Ms. Soraida Thomson is 50-year-old female with a history of hyperlipidemia, CVA, hypertension    Patient is here today for follow-up appointment. She was recently admitted to the hospital with CVA. It appeared embolic in nature. For that reason she was placed on 30-day event monitor to rule out any cardiac source of embolus or A. Fib. She denies any cardiac symptoms that is concerning for angina or heart failure. She denies any palpitation, presyncope or syncope  She is taking all her medications regularly  Denies any nausea, vomiting, abdominal pain, fever, chills, sputum production. No hematuria or other bleeding complaints    Past Medical History:   Diagnosis Date    History of dental abscess     HLD (hyperlipidemia)     Hypertension     Non-Complicance with Hypertension medications (Atenolol)    Lacunar infarction (HCC)     Bilateral Thalamic Infarctions    Lichen sclerosus     of genitalia    Nephrolithiasis     Osteopenia 09/13/2019    By DEXA Scan on 1/90/7662    Umbilical hernia     Small, Fat-containing Only    Vitamin D deficiency 10/08/2015       Review of Systems:  Cardiac symptoms as noted above in HPI. All others negative. Denies fatigue, malaise, skin rash, joint pain, blurring vision, photophobia, neck pain, hemoptysis, chronic cough, nausea, vomiting, hematuria, burning micturition, BRBPR, chronic headaches. Current Outpatient Medications   Medication Sig    escitalopram oxalate (LEXAPRO) 10 mg tablet Take 10 mg by mouth daily. take 1/2 tablet for 7 days then start taking 1 tab daily   by Dr. Mari Main    montelukast (SINGULAIR) 10 mg tablet Take 10 mg by mouth daily.  ergocalciferol (Vitamin D2) 1,250 mcg (50,000 unit) capsule Take 50,000 Units by mouth two (2) times a week.  cyanocobalamin (VITAMIN B12) 500 mcg tablet Take 5,000 mcg by mouth two (2) times a week.     traZODone (DESYREL) 50 mg tablet Take 50 mg by mouth nightly.  aspirin 81 mg chewable tablet Take 1 Tab by mouth daily.  polyethylene glycol (MIRALAX) 17 gram/dose powder Take 17 g by mouth two (2) times a day. 1 tablespoon with 8 oz of water daily    clobetasol (TEMOVATE) 0.05 % ointment Apply  to affected area two (2) times a day. No current facility-administered medications for this visit. No past surgical history on file. Allergies and Sensitivities:  No Known Allergies    Family History:  Family History   Problem Relation Age of Onset    Hypertension Mother     Hypertension Maternal Aunt     Stroke Maternal Aunt     Hypertension Maternal Uncle     Stroke Maternal Uncle     Hypertension Maternal Uncle     Stroke Maternal Uncle        Social History:  Social History     Tobacco Use    Smoking status: Never Smoker    Smokeless tobacco: Never Used   Substance Use Topics    Alcohol use: No    Drug use: No     She  reports that she has never smoked. She has never used smokeless tobacco.  She  reports no history of alcohol use. Physical Exam:  BP Readings from Last 3 Encounters:   01/29/21 95/64   01/07/21 (!) 141/91   01/07/21 (!) 140/92         Pulse Readings from Last 3 Encounters:   01/29/21 83   01/07/21 75   01/07/21 88          Wt Readings from Last 3 Encounters:   12/12/20 168 lb (76.2 kg)   12/12/20 168 lb (76.2 kg)   11/26/15 172 lb (78 kg)       Constitutional: Oriented to person, place, and time. HENT: Head: Normocephalic and atraumatic. Neck: No JVD present. Carotid bruit is not appreciated. Cardiovascular: Regular rhythm. No murmur, gallop or rubs appreciated  Lung: Breath sounds normal. No respiratory distress. No ronchi or rales appreciated  Abdominal: No tenderness. No rebound and no guarding. Musculoskeletal: There is no lower extremity edema. No cynosis  Lymphadenopathy:  No cervical or supraclavicular adenopathy appriciated.        LABS:   @  Lab Results   Component Value Date/Time WBC 8.8 12/15/2020 04:10 AM    HGB 13.4 12/15/2020 04:10 AM    HCT 38.4 12/15/2020 04:10 AM    PLATELET 390 94/52/3970 04:10 AM    MCV 85.1 12/15/2020 04:10 AM     Lab Results   Component Value Date/Time    Sodium 136 12/15/2020 04:10 AM    Potassium 3.8 12/15/2020 04:10 AM    Chloride 101 12/15/2020 04:10 AM    CO2 29 12/15/2020 04:10 AM    Glucose 94 12/15/2020 04:10 AM    BUN 18 12/15/2020 04:10 AM    Creatinine 1.02 12/15/2020 04:10 AM     Lipids Latest Ref Rng & Units 12/12/2020   Chol, Total <200 MG/(H)   HDL 40 - 60 MG/DL 86(H)   LDL 0 - 100 MG/(H)   Trig <150 MG/DL 55   Chol/HDL Ratio 0 - 5.0   2.7   Some recent data might be hidden     Lab Results   Component Value Date/Time    ALT (SGPT) 19 12/13/2020 02:40 AM     Lab Results   Component Value Date/Time    Hemoglobin A1c 5.4 12/12/2020 07:45 AM     Lab Results   Component Value Date/Time    TSH 0.92 12/12/2020 07:45 AM       EKG    ECHO (12/20)  Left Ventricle Normal cavity size and systolic function (ejection fraction normal). Moderate concentric hypertrophy. Wall motion: normal. The estimated EF is 60 - 65%. Visually measured ejection fraction. There is mild (grade 1) left ventricular diastolic dysfunction. Wall Scoring The left ventricular wall motion is normal.            Left Atrium Dilated left atrium. Left Atrium volume index is 36.54 mL/m2. Right Ventricle Normal cavity size and global systolic function. Right Atrium Normal cavity size. Interatrial Septum Agitated saline contrast study was performed. There was no shunting at baseline or with Valsalva. No atrial septal defect present. No patent foramen ovale visualized. Aortic Valve Trileaflet valve structure, no stenosis and no regurgitation. Mitral Valve No stenosis. Mitral valve non-specific thickening. Trace regurgitation. Tricuspid Valve Normal valve structure and no stenosis. Trace regurgitation.    Pulmonary Artery Pulmonary arterial systolic pressure (PASP) is 18 mmHg. Pulmonary hypertension not suggested by Doppler          STRESS TEST (EST, PHARM, NUC, ECHO etc)    CATHETERIZATION    IMPRESSION & PLAN:  50-year-old female with hypertension, hyperlipidemia, CVA    Hypertension:  BP 95/64. Repeat blood pressure was 105/60. Usually blood pressure is better. She is asymptomatic. Continue current antihypertensive. This is being managed by PCP. Hyperlipidemia:  . She was started on atorvastatin in 12/2020. Dosage unknown. She is going to go home and let us know. Consider repeating facility profile before next visit    Patient just returned event monitor to rule out atrial fibrillation as a cause of patient's recent stroke. We will try to obtain that monitor results. Currently she does not appear to have any symptoms concerning for angina heart failure or atrial fibrillation. I have asked her to continue with aspirin    This plan was discussed with patient who is in agreement. Thank you for allowing me to participate in patient care. Please feel free to call me if you have any question or concern. Montana Lewis MD  Please note: This document has been produced using voice recognition software. Unrecognized errors in transcription may be present.

## 2021-01-29 NOTE — PROGRESS NOTES
Jhon Ceja presents today for   Chief Complaint   Patient presents with    New Patient       Jhon Ceja preferred language for health care discussion is english/other. Personal Protective Equipment:   Personal Protective Equipment was used including: mask-surgical and hands-gloves. Patient was placed on no precaution(s). Patient was masked. Is someone accompanying this pt? No    Is the patient using any DME equipment during OV? No    Depression Screening:  3 most recent PHQ Screens 12/14/2020   Little interest or pleasure in doing things Not at all   Feeling down, depressed, irritable, or hopeless Not at all   Total Score PHQ 2 0   Trouble falling or staying asleep, or sleeping too much Not at all   Feeling tired or having little energy Several days   Poor appetite, weight loss, or overeating Several days   Feeling bad about yourself - or that you are a failure or have let yourself or your family down Several days   Trouble concentrating on things such as school, work, reading, or watching TV Not at all   Moving or speaking so slowly that other people could have noticed; or the opposite being so fidgety that others notice Not at all   Thoughts of being better off dead, or hurting yourself in some way Not at all   PHQ 9 Score 3   How difficult have these problems made it for you to do your work, take care of your home and get along with others Not difficult at all       Learning Assessment:  No flowsheet data found. Abuse Screening:  No flowsheet data found. Fall Risk  No flowsheet data found. Pt currently taking Anticoagulant therapy? No    Coordination of Care:  1. Have you been to the ER, urgent care clinic since your last visit? Hospitalized since your last visit? N/A    2. Have you seen or consulted any other health care providers outside of the [address] [place] [address] since your last visit? Include any pap smears or colon screening.  N/A